# Patient Record
Sex: MALE | Race: WHITE | NOT HISPANIC OR LATINO | Employment: FULL TIME | URBAN - METROPOLITAN AREA
[De-identification: names, ages, dates, MRNs, and addresses within clinical notes are randomized per-mention and may not be internally consistent; named-entity substitution may affect disease eponyms.]

---

## 2017-01-21 ENCOUNTER — GENERIC CONVERSION - ENCOUNTER (OUTPATIENT)
Dept: OTHER | Facility: OTHER | Age: 57
End: 2017-01-21

## 2017-02-09 ENCOUNTER — GENERIC CONVERSION - ENCOUNTER (OUTPATIENT)
Dept: OTHER | Facility: OTHER | Age: 57
End: 2017-02-09

## 2017-02-09 LAB
A/G RATIO (HISTORICAL): 1.7 (ref 1.1–2.5)
ALBUMIN SERPL BCP-MCNC: 4.4 G/DL (ref 3.5–5.5)
ALP SERPL-CCNC: 78 IU/L (ref 39–117)
ALT SERPL W P-5'-P-CCNC: 20 IU/L (ref 0–44)
AST SERPL W P-5'-P-CCNC: 30 IU/L (ref 0–40)
BASOPHILS # BLD AUTO: 0 %
BASOPHILS # BLD AUTO: 0 X10E3/UL (ref 0–0.2)
BILIRUB SERPL-MCNC: 0.6 MG/DL (ref 0–1.2)
BUN SERPL-MCNC: 15 MG/DL (ref 6–24)
BUN/CREA RATIO (HISTORICAL): 15 (ref 9–20)
CALCIUM SERPL-MCNC: 9.5 MG/DL (ref 8.7–10.2)
CHLORIDE SERPL-SCNC: 99 MMOL/L (ref 96–106)
CHOLEST SERPL-MCNC: 216 MG/DL (ref 100–199)
CO2 SERPL-SCNC: 24 MMOL/L (ref 18–29)
CREAT SERPL-MCNC: 1 MG/DL (ref 0.76–1.27)
DEPRECATED RDW RBC AUTO: 14.1 % (ref 12.3–15.4)
EGFR AFRICAN AMERICAN (HISTORICAL): 96 ML/MIN/1.73
EGFR-AMERICAN CALC (HISTORICAL): 83 ML/MIN/1.73
EOSINOPHIL # BLD AUTO: 0.1 X10E3/UL (ref 0–0.4)
EOSINOPHIL # BLD AUTO: 1 %
GLUCOSE SERPL-MCNC: 79 MG/DL (ref 65–99)
HCT VFR BLD AUTO: 44.7 % (ref 37.5–51)
HDLC SERPL-MCNC: 62 MG/DL
HGB BLD-MCNC: 14.5 G/DL (ref 12.6–17.7)
IMM.GRANULOCYTES (CD4/8) (HISTORICAL): 0 %
IMM.GRANULOCYTES (CD4/8) (HISTORICAL): 0 X10E3/UL (ref 0–0.1)
LDLC SERPL CALC-MCNC: 136 MG/DL (ref 0–99)
LYMPHOCYTES # BLD AUTO: 1 X10E3/UL (ref 0.7–3.1)
LYMPHOCYTES # BLD AUTO: 21 %
MCH RBC QN AUTO: 28.2 PG (ref 26.6–33)
MCHC RBC AUTO-ENTMCNC: 32.4 G/DL (ref 31.5–35.7)
MCV RBC AUTO: 87 FL (ref 79–97)
MONOCYTES # BLD AUTO: 0.4 X10E3/UL (ref 0.1–0.9)
MONOCYTES (HISTORICAL): 8 %
NEUTROPHILS # BLD AUTO: 3.2 X10E3/UL (ref 1.4–7)
NEUTROPHILS # BLD AUTO: 70 %
PLATELET # BLD AUTO: 278 X10E3/UL (ref 150–379)
POTASSIUM SERPL-SCNC: 4.2 MMOL/L (ref 3.5–5.2)
RBC (HISTORICAL): 5.14 X10E6/UL (ref 4.14–5.8)
SODIUM SERPL-SCNC: 141 MMOL/L (ref 134–144)
TOT. GLOBULIN, SERUM (HISTORICAL): 2.6 G/DL (ref 1.5–4.5)
TOTAL PROTEIN (HISTORICAL): 7 G/DL (ref 6–8.5)
TRIGL SERPL-MCNC: 92 MG/DL (ref 0–149)
WBC # BLD AUTO: 4.7 X10E3/UL (ref 3.4–10.8)

## 2017-02-10 LAB — INTERPRETATION (HISTORICAL): NORMAL

## 2017-02-11 ENCOUNTER — GENERIC CONVERSION - ENCOUNTER (OUTPATIENT)
Dept: OTHER | Facility: OTHER | Age: 57
End: 2017-02-11

## 2017-02-20 ENCOUNTER — ALLSCRIPTS OFFICE VISIT (OUTPATIENT)
Dept: OTHER | Facility: OTHER | Age: 57
End: 2017-02-20

## 2017-09-08 ENCOUNTER — ALLSCRIPTS OFFICE VISIT (OUTPATIENT)
Dept: OTHER | Facility: OTHER | Age: 57
End: 2017-09-08

## 2017-09-08 DIAGNOSIS — M25.469 EFFUSION OF KNEE: ICD-10-CM

## 2017-09-08 DIAGNOSIS — R21 RASH AND OTHER NONSPECIFIC SKIN ERUPTION: ICD-10-CM

## 2017-09-15 ENCOUNTER — GENERIC CONVERSION - ENCOUNTER (OUTPATIENT)
Dept: OTHER | Facility: OTHER | Age: 57
End: 2017-09-15

## 2017-09-15 ENCOUNTER — HOSPITAL ENCOUNTER (OUTPATIENT)
Dept: RADIOLOGY | Facility: HOSPITAL | Age: 57
Discharge: HOME | End: 2017-09-15
Attending: FAMILY MEDICINE | Admitting: RADIOLOGY
Payer: COMMERCIAL

## 2017-09-15 DIAGNOSIS — R21 RASH AND OTHER NONSPECIFIC SKIN ERUPTION: ICD-10-CM

## 2017-09-15 DIAGNOSIS — M25.469 EFFUSION OF KNEE: ICD-10-CM

## 2017-09-15 PROCEDURE — 76882 US LMTD JT/FCL EVL NVASC XTR: CPT

## 2017-09-16 ENCOUNTER — GENERIC CONVERSION - ENCOUNTER (OUTPATIENT)
Dept: OTHER | Facility: OTHER | Age: 57
End: 2017-09-16

## 2017-09-17 LAB
LYME 18 KD IGG (HISTORICAL): PRESENT
LYME 23 KD IGG (HISTORICAL): ABNORMAL
LYME 23 KD IGM (HISTORICAL): ABNORMAL
LYME 28 KD IGG (HISTORICAL): ABNORMAL
LYME 30 KD IGG (HISTORICAL): ABNORMAL
LYME 39 KD IGG (HISTORICAL): PRESENT
LYME 39 KD IGM (HISTORICAL): ABNORMAL
LYME 41 KD IGG (HISTORICAL): PRESENT
LYME 41 KD IGM (HISTORICAL): ABNORMAL
LYME 45 KD IGG (HISTORICAL): PRESENT
LYME 58 KD IGG (HISTORICAL): PRESENT
LYME 66 KD IGG (HISTORICAL): ABNORMAL
LYME 93 KD IGG (HISTORICAL): ABNORMAL
LYME IGG WB INTERP. (HISTORICAL): POSITIVE
LYME IGG/IGM AB (HISTORICAL): 2.84 ISR (ref 0–0.9)
LYME IGM (HISTORICAL): 0.88 INDEX (ref 0–0.79)
LYME IGM WB INTERP. (HISTORICAL): NEGATIVE

## 2017-09-18 ENCOUNTER — GENERIC CONVERSION - ENCOUNTER (OUTPATIENT)
Dept: OTHER | Facility: OTHER | Age: 57
End: 2017-09-18

## 2018-01-09 NOTE — RESULT NOTES
Verified Results  [facility]) CMP14+eGFR [id number] 12:00AM [place]     Test Name Result Flag Reference   Glucose, Serum 81 mg/dL  65-99   BUN 9 mg/dL  6-24   Creatinine, Serum 0 78 mg/dL  0 76-1 27   eGFR If NonAfricn Am 101 mL/min/1 73  >59   eGFR If Africn Am 117 mL/min/1 73  >59   BUN/Creatinine Ratio 12  9-20   Sodium, Serum 140 mmol/L  134-144   Potassium, Serum 4 2 mmol/L  3 5-5 2   Chloride, Serum 99 mmol/L     Carbon Dioxide, Total 25 mmol/L  18-29   Calcium, Serum 9 3 mg/dL  8 7-10 2   Protein, Total, Serum 7 0 g/dL  6 0-8 5   Albumin, Serum 4 4 g/dL  3 5-5 5   Globulin, Total 2 6 g/dL  1 5-4 5   A/G Ratio 1 7  1 1-2 5   Bilirubin, Total 0 5 mg/dL  0 0-1 2   Alkaline Phosphatase, S 87 IU/L     AST (SGOT) 21 IU/L  0-40   ALT (SGPT) 18 IU/L  0-44     (LC) Lipid Panel [id number] 12:00AM [place] [facility]     Test Name Result Flag Reference   Cholesterol, Total 199 mg/dL  100-199   Triglycerides 95 mg/dL  0-149   HDL Cholesterol 51 mg/dL  >39   According to ATP-III Guidelines, HDL-C >59 mg/dL is considered a  negative risk factor for CHD  VLDL Cholesterol [place] 19 mg/dL  5-40   LDL Cholesterol Calc 129 mg/dL H 0-99     (LC) Thyroid Cascade Profile [id number] 12:00AM [facility]     Test Name Result Flag Reference   TSH 1 130 uIU/mL  0 450-4 500       Discussion/Summary   Sugar, kidneys, minerals and liver are normal    Cholesterol is normal    LDL bad cholesterol goal <160     Thyroid level is normal   Dr Thao Cons

## 2018-01-09 NOTE — PROGRESS NOTES
Assessment    1  Encounter for preventive health examination (V70 0) (Z00 00)   2  Left inguinal hernia (550 90) (K40 90)   3  Body mass index (BMI) 24 0-24 9, adult (V85 1) (Z68 24)   4  [place] cardiac risk 10-20% in next 10 years (V49 89) (Z91 89)    Plan  [place] cardiac risk 10-20% in next 10 years    · Aspirin 81 MG Oral Tablet Delayed Release; 1 every day  Persistent insomnia    · Zolpidem Tartrate 5 MG Oral Tablet; 1 Every Day At Bedtime, As Needed    Discussion/Summary  Impression: health maintenance visit  Testicular cancer screening: the risks and benefits of testicular cancer screening were discussed  Colorectal cancer screening: the risks and benefits of colorectal cancer screening were discussed  Advice and education were given regarding sunscreen use  Chief Complaint  Seen for a CPE  er/cma  History of Present Illness  HM, Adult Male: The patient is being seen for a health maintenance evaluation  General Health: The patient's health since the last visit is described as good  Immunizations status: up to date   not sure if had adacel  Lifestyle:  He consumes a diverse and healthy diet  He does not have any weight concerns  He does not use tobacco    Screening:      Review of Systems    Cardiovascular: no chest pain  Respiratory: no shortness of breath  Active Problems    1  Colon cancer screening (V76 51) (Z12 11)   2  Depression screening (V79 0) (Z13 89)   3  Diverticulosis of large intestine without hemorrhage (562 10) (K57 30)   4  Elevated PSA (790 93) (R97 20)   5  Fatigue (780 79) (R53 83)   6  Immunization due (V05 9) (Z23)   7  Persistent insomnia (307 42) (G47 00)   8  Peyronie disease (607 85) (N48 6)   9  Preop examination (V72 84) (Z01 818)   10  Prostate cancer screening (V76 44) (Z12 5)   11  Screening for cardiovascular, respiratory, and genitourinary diseases    (V81 2,V81 4,V81 6) (Z13 6,Z13 83,Z13 89)   12   Screening for diabetes mellitus (DM) (V77 1) (Z13 1)   13  Wears glasses (V49 89) (Z97 3)    Past Medical History    · History of Immunization due (V05 9) (Z23)   · History of Prediabetes (790 29) (R73 09)   · History of Right inguinal hernia (550 90) (K40 90)    Surgical History    · History of Hernia Repair    Family History  Mother    · Family history of Hypertension  Father    · Family history of High cholesterol    Social History    · Daily alcohol use   · Denied: History of Drug use   ·    · Never a smoker   · Two children    Current Meds   1  Zolpidem Tartrate 5 MG Oral Tablet; 1 Every Day At Bedtime, As Needed; Therapy: [id number] to (Evaluate:79Xiu9454); Last Rx:[id number] Ordered    Allergies    1  No Known Drug Allergies    Vitals   Recorded: 96Yxi9702 04:08PM   Temperature 97 3 F   Heart Rate 68   Respiration 16   Systolic 704   Diastolic 90   Height 5 ft 8 in   Weight 164 lb    BMI Calculated 24 94   BSA Calculated 1 88     Physical Exam    Constitutional   General appearance: No acute distress, well appearing and well nourished  Eyes   Conjunctiva and lids: No erythema, swelling or discharge  Pupils and irises: Equal, round, reactive to light  Ophthalmoscopic examination: Normal fundi and optic discs  Ears, Nose, Mouth, and Throat   External inspection of ears and nose: Normal     Otoscopic examination: Tympanic membranes translucent with normal light reflex  Canals patent without erythema  Nasal mucosa, septum, and turbinates: Normal without edema or erythema  Lips, teeth, and gums: Normal, good dentition  Oropharynx: Normal with no erythema, edema, exudate or lesions  Neck   Neck: Supple, symmetric, trachea midline, no masses  Pulmonary   Respiratory effort: No increased work of breathing or signs of respiratory distress  Auscultation of lungs: Clear to auscultation  Cardiovascular   Auscultation of heart: Normal rate and rhythm, normal S1 and S2, no murmurs  Carotid pulses: 2+ bilaterally  Abdominal aorta: Normal     Examination of extremities for edema and/or varicosities: Normal     Abdomen   Abdomen: Non-tender, no masses  Liver and spleen: No hepatomegaly or splenomegaly  Examination for hernias: Abnormal   small LIH  Anus, perineum, and rectum: Normal sphincter tone, no masses, no prolapse  Genitourinary   Scrotal contents: Normal testes, no masses  Penis: Normal, no lesions  Lymphatic   Palpation of lymph nodes in neck: No lymphadenopathy  Palpation of lymph nodes in groin: No lymphadenopathy  Musculoskeletal   Gait and station: Normal     Inspection/palpation of digits and nails: Normal without clubbing or cyanosis  Inspection/palpation of joints, bones, and muscles: Normal     Skin   Skin and subcutaneous tissue: Normal without rashes or lesions  Palpation of skin and subcutaneous tissue: Normal turgor  Neurologic   Reflexes: 2+ and symmetric  Sensation: No sensory loss  Psychiatric   Judgment and insight: Normal     Mood and affect: Normal        Results/Data  [place] Risk for General CVD [id number] 04:20PM Leilani Boothe     Test Name Result Flag Reference   [place] CVD - Ten Year 10 7 %     Sex: Male  Age: 61  Total Cholesterol: 216 mg/dL  HDL Cholesterol: 62 mg/dL  Systolic Blood Pressure: 773 mmHg  Diabetes: No  Smoking Status: No  Being treated for hypertension: No   [place] CVD - Heart Age 63 Years     [place] CVD - Normal 11 2 %         Procedure    Procedure: Visual Acuity Test    Indication: routine screening  Inforrmation supplied by a Snellen chart     Results: 20/20 in both eyes with corrective device, 20/20 in the right eye with corrective device, 20/20 in the left eye with corrective device      Provider Comments  Provider Comments:   prostate bx neg, f/u urology until clear      Signatures   Electronically signed by : Padmini Blackmon DO; Feb 20 2017  8:54PM EST                       (Author)

## 2018-01-09 NOTE — RESULT NOTES
Discussion/Summary   The ultrasound of the back of the left knee did not show any swelling or Berg's cyst  Dr Domenic Warren [id number] 08:33AM [place] [facility] Order Number: [id number]   Performing Comments: r/o Bakers cyst left KNEE   - Patient Instructions: To schedule this appointment, please contact Central Scheduling at [phone]  Test Name Result Flag Reference   US MSK LIMITED (Report)     ULTRASOUND LEFT POPLITEAL FOSSA     TECHNIQUE:  Using a high frequency transducer, the left popliteal fossa was scanned to evaluate for the presence of a Baker's cyst      INDICATION: 54-year-old man with swelling and rash in the posterior knee after hiking  COMPARISON:        FINDINGS:     Normal muscle and subcutaneous fat in the left popliteal fossa  No evidence of mass or fluid filled structure         IMPRESSION:     Normal sonogram  No left-sided Baker's cyst        Workstation performed: [id number]     Signed by:   Tasneem Machado MD   9/15/17

## 2018-01-10 NOTE — RESULT NOTES
Message   urology eval Dr Cara Chaparro     Verified Results  (1) PSA (SCREEN) (Dx V76 44 Screen for Prostate Cancer) [id number] 12:00AM [facility]     Test Name Result Flag Reference   Prostate Specific Ag, Serum 8 8 ng/mL H 0 0-4 0   Roche ECLIA methodology  According to the American Urological Association, Serum PSA should  decrease and remain at undetectable levels after radical  prostatectomy  The AUA defines biochemical recurrence as an initial  PSA value 0 2 ng/mL or greater followed by a subsequent confirmatory  PSA value 0 2 ng/mL or greater  Values obtained with different assay methods or kits cannot be used  interchangeably  Results cannot be interpreted as absolute evidence  of the presence or absence of malignant disease

## 2018-01-11 NOTE — PROGRESS NOTES
Assessment    1  Prostate cancer screening (V76 44) (Z12 5)   2  Colon cancer screening (V76 51) (Z12 11)   3  Encounter for preventive health examination (V70 0) (Z00 00)   4  Prediabetes (790 29) (R73 09)   5  Screening for diabetes mellitus (DM) (V77 1) (Z13 1)   6  Screening for lipid disorders (V77 91) (Z13 220)   7  Persistent insomnia (307 42) (G47 00)   8  Body mass index (BMI) 24 0-24 9, adult (V85 1) (Z68 24)    Plan  Colon cancer screening, Health Maintenance, Prediabetes, Prostate cancer screening,  Screening for diabetes mellitus (DM), Screening for lipid disorders    · (1) PSA (SCREEN) (Dx V76 44 Screen for Prostate Cancer); Status:Active; Requested  for:56Xqe6635;    · ([address] [place] [address]) CMP14+eGFR; Status:Active; Requested for:16Nyb7658;    · ([address] [place] [address]) Lipid Panel; Status:Active; Requested for:81Tzp3426;    · ([address] [place] [address]) Thyroid Cascade Profile; Status:Active; Requested for:70Dzv2909;   Persistent insomnia    · Zolpidem Tartrate 5 MG Oral Tablet; 1 Every Day At Bedtime, As Needed    Discussion/Summary  Impression: health maintenance visit  CALVIN done  The patient was counseled regarding risk factor reductions, impressions  Chief Complaint  Seen for a CPE  er/cma   saw uro in past but no significant problem  hx shane travis  had inguinal hernia repair on right  trouble sleeping for years, DFA but mostly DSA      History of Present Illness  HM, Adult Male: The patient is being seen for a health maintenance evaluation  General Health: The patient's health since the last visit is described as good  Lifestyle:  He consumes a diverse and healthy diet  He exercises regularly  He does not use tobacco  He consumes alcohol  gym, 1 red wine/d  Screening:      Review of Systems    Cardiovascular: no chest pain  Respiratory: no shortness of breath  Gastrointestinal: no abdominal pain and no blood in stools  Psychiatric: no anxiety and no depression  Active Problems    1   Colon cancer screening (V76 51) (Z12 11)   2  Depression screening (V79 0) (Z13 89)   3  Fatigue (780 79) (R53 83)   4  Immunization due (V05 9) (Z23)   5  Prediabetes (790 29) (R73 09)   6  Prostate cancer screening (V76 44) (Z12 5)   7  Screening for diabetes mellitus (DM) (V77 1) (Z13 1)   8  Screening for lipid disorders (V77 91) (Z13 220)   9  Wears glasses (V49 89) (Z97 3)    Past Medical History    · History of Immunization due (V05 9) (Z23)   · History of Right inguinal hernia (550 90) (K40 90)    Surgical History    · History of Hernia Repair    Family History    · Family history of Hypertension    · Family history of High cholesterol    Social History    · Daily alcohol use   · Denied: History of Drug use   ·    · Never a smoker   · Two children    Current Meds   1  No Reported Medications Recorded    Allergies    1  No Known Drug Allergies    Vitals   Recorded: [id number] 02:51PM   Temperature 98 1 F   Heart Rate 64   Respiration 16   Systolic 924   Diastolic 84   Height 5 ft 8 in   Weight 160 lb    BMI Calculated 24 33   BSA Calculated 1 86     Physical Exam    Constitutional   General appearance: No acute distress, well appearing and well nourished  Eyes   Conjunctiva and lids: No erythema, swelling or discharge  Pupils and irises: Equal, round, reactive to light  Ophthalmoscopic examination: Normal fundi and optic discs  Ears, Nose, Mouth, and Throat   External inspection of ears and nose: Normal     Otoscopic examination: Tympanic membranes translucent with normal light reflex  Canals patent without erythema  Hearing: Normal     Nasal mucosa, septum, and turbinates: Normal without edema or erythema  Lips, teeth, and gums: Normal, good dentition  Oropharynx: Normal with no erythema, edema, exudate or lesions  Neck   Neck: Supple, symmetric, trachea midline, no masses  Thyroid: Normal, no thyromegaly  Pulmonary   Respiratory effort: No increased work of breathing or signs of respiratory distress  Auscultation of lungs: Clear to auscultation  Cardiovascular   Auscultation of heart: Normal rate and rhythm, normal S1 and S2, no murmurs  Carotid pulses: 2+ bilaterally  Abdominal aorta: Normal     Femoral pulses: 2+ bilaterally  Pedal pulses: 2+ bilaterally  Examination of extremities for edema and/or varicosities: Normal     Abdomen   Abdomen: Non-tender, no masses  Liver and spleen: No hepatomegaly or splenomegaly  Examination for hernias: No hernias appreciated  Anus, perineum, and rectum: Normal sphincter tone, no masses, no prolapse  Genitourinary   Scrotal contents: Normal testes, no masses  Penis: Normal, no lesions  Digital rectal exam of prostate: Normal size, no masses  Lymphatic   Palpation of lymph nodes in neck: No lymphadenopathy  Palpation of lymph nodes in groin: No lymphadenopathy  Musculoskeletal   Gait and station: Normal     Inspection/palpation of digits and nails: Normal without clubbing or cyanosis  Inspection/palpation of joints, bones, and muscles: Normal     Range of motion: Normal     Stability: Normal     Muscle strength/tone: Normal     Skin   Skin and subcutaneous tissue: Normal without rashes or lesions  Palpation of skin and subcutaneous tissue: Normal turgor  Neurologic   Reflexes: 2+ and symmetric  Sensation: No sensory loss  Psychiatric   Judgment and insight: Normal     Mood and affect: Normal        Procedure    Procedure: Visual Acuity Test    Indication: routine screening  Inforrmation supplied by a Snellen chart     Results: 20/20 in both eyes without corrective device, 20/40 in the right eye without corrective device, 20/40 in the left eye without corrective device      Signatures   Electronically signed by : Korey Leblanc DO; Feb 16 2016  9:23PM EST                       (Author)

## 2018-01-11 NOTE — RESULT NOTES
Verified Results  ([facility]) Lyme Ab/Western Blot Reflex [id number] 07:42AM Phyliss Severs     Test Name Result Flag Reference   Lyme IgG/IgM Ab 2 84 ISR H 0 00-0 90   Negative         <0 91                                                 Equivocal  0 91 - 1 09                                                 Positive         >1 09   Lyme Disease Ab, Quant, IgM 0 88 index H 0 00-0 79   Negative         <0 80                                                 Equivocal  0 80 - 1 19                                                 Positive         >1 19                  IgM levels may peak at 3-6 weeks post infection, then                  gradually decline  IgG P93 Ab  Absent     IgG P66 Ab  Absent     IgG P58 Ab  Present A    IgG P45 Ab  Present A    IgG P41 Ab  Present A    IgG P39 Ab  Present A    IgG P30 Ab  Absent     IgG P28 Ab  Absent     IgG P23 Ab  Absent     IgG P18 Ab  Present A    Lyme IgG WB Interp  Positive A    Positive: 5 of the following                                                Borrelia-specific bands:                                                18,23,28,30,39,41,45,58,                                                66, and 93  Negative: No bands or banding                                                patterns which do not                                                meet positive criteria  IgM P41 Ab  Absent     IgM P39 Ab  Absent     IgM P23 Ab  Absent     Lyme IgM WB Interp  Negative     Note: An equivocal or positive EIA result followed by a negative  Western Blot result is considered NEGATIVE  An equivocal or positive  EIA result followed by a positive Western Blot is considered POSITIVE  by the CDC  Positive: 2 of the following bands: 23,39 or 41  Negative: No bands or banding patterns which do not meet positive  criteria    Criteria for positivity are those recommended by CDC/ASTPHLD   p23=Osp C, o02=hyetmnazy  Note:  Sera from individuals with the following may cross react in the  Lyme Western Blot assays: other spirochetal diseases (periodontal  disease, leptospirosis, relapsing fever, yaws, and pinta);  connective autoimmune (Rheumatoid Arthritis and Systemic Lupus  Erythematosus and also individuals with Antinuclear Antibody);  other infections COFFEE [facility] Spotted Fever; Scott-Barr Virus,  and Cytomegalovirus)         Plan  Lyme disease    · Doxycycline Hyclate 100 MG Oral Capsule; TAKE 1 CAPSULE TWICE DAILY  UNTIL GONE

## 2018-01-12 NOTE — RESULT NOTES
Verified Results  (1) COMPREHENSIVE METABOLIC PANEL [id number] 25:28LR Latricia Davidson     Test Name Result Flag Reference   Glucose, Serum 79 mg/dL  65-99   BUN 15 mg/dL  6-24   Creatinine, Serum 1 00 mg/dL  0 76-1 27   eGFR If NonAfricn Am 83 mL/min/1 73  >59   eGFR If Africn Am 96 mL/min/1 73  >59   BUN/Creatinine Ratio 15  9-20   Sodium, Serum 141 mmol/L  134-144   Potassium, Serum 4 2 mmol/L  3 5-5 2   Chloride, Serum 99 mmol/L     Carbon Dioxide, Total 24 mmol/L  18-29   Calcium, Serum 9 5 mg/dL  8 7-10 2   Protein, Total, Serum 7 0 g/dL  6 0-8 5   Albumin, Serum 4 4 g/dL  3 5-5 5   Globulin, Total 2 6 g/dL  1 5-4 5   A/G Ratio 1 7  1 1-2 5   Bilirubin, Total 0 6 mg/dL  0 0-1 2   Alkaline Phosphatase, S 78 IU/L     AST (SGOT) 30 IU/L  0-40   ALT (SGPT) 20 IU/L  0-44

## 2018-01-13 VITALS
HEIGHT: 68 IN | HEART RATE: 68 BPM | BODY MASS INDEX: 24.86 KG/M2 | SYSTOLIC BLOOD PRESSURE: 128 MMHG | RESPIRATION RATE: 16 BRPM | WEIGHT: 164 LBS | TEMPERATURE: 97.3 F | DIASTOLIC BLOOD PRESSURE: 90 MMHG

## 2018-01-14 VITALS
BODY MASS INDEX: 23.79 KG/M2 | HEART RATE: 60 BPM | SYSTOLIC BLOOD PRESSURE: 124 MMHG | HEIGHT: 68 IN | DIASTOLIC BLOOD PRESSURE: 80 MMHG | RESPIRATION RATE: 16 BRPM | WEIGHT: 157 LBS | TEMPERATURE: 95.6 F

## 2018-01-17 NOTE — MISCELLANEOUS
Message   Recorded as Task   Date: 12/12/2016 06:13 PM, Created By: Iveth Nava   Task Name: Follow Up   Assigned To: Livan Goldstein   Regarding Patient: Jolene Maya, Status: Active   Comment:    Livan Goldstein - 12 Dec 2016 6:13 PM     TASK CREATED  I received preop clearance request from his urologist Dr Florin Lacey  Attached is an rx of preop tests to be done for the surgery  If pt has the rx then he should have it done 1w before his preop visit on 12/20/16  If he does not have the rx, then he should  a copy of it (put in nurse pending)  The EKG can be done here at his visit (so he can cross it out)  LB   Livan Goldstein - 12 Dec 2016 6:18 PM     TASK EDITED  I s/w pt   he'll get labs done tomorrow for appt on 12/16/16, procedure is 12/20/16 actually          Signatures   Electronically signed by : Madison Roque DO; Dec 12 2016  6:18PM EST                       (Author)

## 2018-02-20 ENCOUNTER — TELEPHONE (OUTPATIENT)
Dept: FAMILY MEDICINE CLINIC | Facility: CLINIC | Age: 58
End: 2018-02-20

## 2018-02-21 DIAGNOSIS — Z13.83 SCREENING FOR CARDIOVASCULAR, RESPIRATORY, AND GENITOURINARY DISEASES: ICD-10-CM

## 2018-02-21 DIAGNOSIS — Z13.6 SCREENING FOR CARDIOVASCULAR, RESPIRATORY, AND GENITOURINARY DISEASES: ICD-10-CM

## 2018-02-21 DIAGNOSIS — Z00.00 HEALTHCARE MAINTENANCE: Primary | ICD-10-CM

## 2018-02-21 DIAGNOSIS — Z13.1 SCREENING FOR DIABETES MELLITUS (DM): ICD-10-CM

## 2018-02-21 DIAGNOSIS — Z13.89 SCREENING FOR CARDIOVASCULAR, RESPIRATORY, AND GENITOURINARY DISEASES: ICD-10-CM

## 2018-02-21 PROBLEM — A69.20 LYME DISEASE: Status: ACTIVE | Noted: 2017-09-17

## 2018-02-21 PROBLEM — K40.90 LEFT INGUINAL HERNIA: Status: ACTIVE | Noted: 2017-02-20

## 2018-02-21 RX ORDER — ZOLPIDEM TARTRATE 5 MG/1
TABLET ORAL
COMMUNITY
Start: 2016-02-16 | End: 2018-03-16 | Stop reason: SDUPTHER

## 2018-02-26 LAB
ALBUMIN SERPL-MCNC: 4.3 G/DL (ref 3.5–5.5)
ALBUMIN/GLOB SERPL: 1.9 {RATIO} (ref 1.2–2.2)
ALP SERPL-CCNC: 75 IU/L (ref 39–117)
ALT SERPL-CCNC: 18 IU/L (ref 0–44)
AMBIG ABBREV DEFAULT: NORMAL
AST SERPL-CCNC: 27 IU/L (ref 0–40)
BILIRUB SERPL-MCNC: 0.4 MG/DL (ref 0–1.2)
BUN SERPL-MCNC: 10 MG/DL (ref 6–24)
BUN/CREAT SERPL: 12 (ref 9–20)
CALCIUM SERPL-MCNC: 9.3 MG/DL (ref 8.7–10.2)
CHLORIDE SERPL-SCNC: 102 MMOL/L (ref 96–106)
CHOLEST SERPL-MCNC: 186 MG/DL (ref 100–199)
CO2 SERPL-SCNC: 25 MMOL/L (ref 18–29)
CREAT SERPL-MCNC: 0.82 MG/DL (ref 0.76–1.27)
GLOBULIN SER-MCNC: 2.3 G/DL (ref 1.5–4.5)
GLUCOSE SERPL-MCNC: 102 MG/DL (ref 65–99)
HDLC SERPL-MCNC: 56 MG/DL
LDLC SERPL CALC-MCNC: 115 MG/DL (ref 0–99)
MICRODELETION SYND BLD/T FISH: NORMAL
POTASSIUM SERPL-SCNC: 4.4 MMOL/L (ref 3.5–5.2)
PROT SERPL-MCNC: 6.6 G/DL (ref 6–8.5)
SL AMB EGFR AFRICAN AMERICAN: 113 ML/MIN/1.73
SL AMB EGFR NON AFRICAN AMERICAN: 97 ML/MIN/1.73
SODIUM SERPL-SCNC: 140 MMOL/L (ref 134–144)
TRIGL SERPL-MCNC: 76 MG/DL (ref 0–149)

## 2018-03-16 ENCOUNTER — OFFICE VISIT (OUTPATIENT)
Dept: FAMILY MEDICINE CLINIC | Facility: CLINIC | Age: 58
End: 2018-03-16
Payer: COMMERCIAL

## 2018-03-16 VITALS
DIASTOLIC BLOOD PRESSURE: 80 MMHG | SYSTOLIC BLOOD PRESSURE: 128 MMHG | RESPIRATION RATE: 16 BRPM | TEMPERATURE: 96.2 F | BODY MASS INDEX: 23.34 KG/M2 | HEART RATE: 64 BPM | HEIGHT: 68 IN | WEIGHT: 154 LBS

## 2018-03-16 DIAGNOSIS — Z00.00 HEALTHCARE MAINTENANCE: Primary | ICD-10-CM

## 2018-03-16 DIAGNOSIS — G47.00 PERSISTENT INSOMNIA: ICD-10-CM

## 2018-03-16 DIAGNOSIS — K40.90 LEFT INGUINAL HERNIA: ICD-10-CM

## 2018-03-16 DIAGNOSIS — Z91.89 FRAMINGHAM CARDIAC RISK <10% IN NEXT 10 YEARS: ICD-10-CM

## 2018-03-16 DIAGNOSIS — R73.01 IFG (IMPAIRED FASTING GLUCOSE): ICD-10-CM

## 2018-03-16 PROCEDURE — 99396 PREV VISIT EST AGE 40-64: CPT | Performed by: FAMILY MEDICINE

## 2018-03-16 RX ORDER — ZOLPIDEM TARTRATE 5 MG/1
5 TABLET ORAL
Qty: 30 TABLET | Refills: 5 | Status: SHIPPED | OUTPATIENT
Start: 2018-03-16 | End: 2018-05-21 | Stop reason: SDUPTHER

## 2018-03-16 NOTE — PROGRESS NOTES
Assessment/Plan:    Cont zolpidem prn  uro f/u for psa  LIH small but monitor  Prediabetes on labs aware     Diagnoses and all orders for this visit:    [place] cardiac risk <10% in next 10 years    Healthcare maintenance    Persistent insomnia  -     zolpidem (AMBIEN) 5 mg tablet; Take 1 tablet (5 mg total) by mouth daily at bedtime as needed for sleep    Left inguinal hernia    IFG (impaired fasting glucose)        Labs reviewed  Prediabetes advised  Diet/exercise/weight loss is recommended  framingham score 9 4 this year, statin use discussed,we will recalculate next year    No Follow-up on file  Subjective:      Patient ID: Paty Che is a 61 y o  male  Chief Complaint   Patient presents with    Physical Exam       HPI  occas zolpidem  Prn  uro f/u, bx neg but psa over 4 consistently  Eats healthy  No mood issues    The following portions of the patient's history were reviewed and updated as appropriate: allergies, current medications, past family history, past medical history, past social history, past surgical history and problem list     Review of Systems   Respiratory: Negative for shortness of breath  Cardiovascular: Negative for chest pain  Current Outpatient Prescriptions   Medication Sig Dispense Refill    aspirin 81 MG tablet Take by mouth daily      zolpidem (AMBIEN) 5 mg tablet Take 1 tablet (5 mg total) by mouth daily at bedtime as needed for sleep 30 tablet 5     No current facility-administered medications for this visit  Objective:    /80   Pulse 64   Temp (!) 96 2 °F (35 7 °C)   Resp 16   Ht 5' 8" (1 727 m)   Wt 69 9 kg (154 lb)   BMI 23 42 kg/m²        Physical Exam   Constitutional: He appears well-developed  HENT:   Head: Normocephalic  Eyes: Conjunctivae are normal    Neck: Neck supple  Cardiovascular: Normal rate and intact distal pulses  Pulmonary/Chest: Effort normal  No respiratory distress  Abdominal: Soft     Genitourinary: Penis normal    Genitourinary Comments: Small left inguinal hernia   Musculoskeletal: He exhibits no edema or deformity  Neurological: He is alert  Skin: Skin is warm and dry  Psychiatric: His behavior is normal  Thought content normal    Nursing note and vitals reviewed               Emeterio Boland DO

## 2018-05-21 ENCOUNTER — TELEPHONE (OUTPATIENT)
Dept: FAMILY MEDICINE CLINIC | Facility: CLINIC | Age: 58
End: 2018-05-21

## 2018-05-21 DIAGNOSIS — G47.00 PERSISTENT INSOMNIA: Primary | ICD-10-CM

## 2018-05-21 RX ORDER — ZOLPIDEM TARTRATE 5 MG/1
5 TABLET ORAL
Qty: 30 TABLET | Refills: 2 | Status: SHIPPED | OUTPATIENT
Start: 2018-05-21 | End: 2018-11-28 | Stop reason: SDUPTHER

## 2018-05-21 NOTE — TELEPHONE ENCOUNTER
Please advise  I spoke with Shannon from [facility]  States that pt brought in a script for Ambien dated 3/2018  She stated she could take a verbal and fill it for this date  Due to the fact that it is a controlled substance I did not give verbal  If you are okay with filling this please [facility]  Thank you   Krzysztof Rodriguez, [address]

## 2018-11-28 DIAGNOSIS — G47.00 PERSISTENT INSOMNIA: ICD-10-CM

## 2018-11-28 RX ORDER — ZOLPIDEM TARTRATE 5 MG/1
5 TABLET ORAL
Qty: 30 TABLET | Refills: 0 | Status: SHIPPED | OUTPATIENT
Start: 2018-11-28 | End: 2019-04-22 | Stop reason: SDUPTHER

## 2019-03-19 ENCOUNTER — TELEPHONE (OUTPATIENT)
Dept: FAMILY MEDICINE CLINIC | Facility: CLINIC | Age: 59
End: 2019-03-19

## 2019-03-19 DIAGNOSIS — R73.01 IFG (IMPAIRED FASTING GLUCOSE): ICD-10-CM

## 2019-03-19 DIAGNOSIS — Z13.6 SCREENING FOR CARDIOVASCULAR, RESPIRATORY, AND GENITOURINARY DISEASES: Primary | ICD-10-CM

## 2019-03-19 DIAGNOSIS — Z13.83 SCREENING FOR CARDIOVASCULAR, RESPIRATORY, AND GENITOURINARY DISEASES: Primary | ICD-10-CM

## 2019-03-19 DIAGNOSIS — Z13.89 SCREENING FOR CARDIOVASCULAR, RESPIRATORY, AND GENITOURINARY DISEASES: Primary | ICD-10-CM

## 2019-03-19 DIAGNOSIS — Z13.1 SCREENING FOR DIABETES MELLITUS (DM): ICD-10-CM

## 2019-03-19 NOTE — TELEPHONE ENCOUNTER
DR Mando Veliz    Patient needs a lab slip for lab rajani   He has an upcomming cpe      Please fax to [phone]

## 2019-03-21 NOTE — TELEPHONE ENCOUNTER
Called patient because fax number is not a working fax and he will give me another fax number but was driving at the time    Told him to ask for me when he calls

## 2019-04-03 LAB
ALBUMIN SERPL-MCNC: 4.6 G/DL (ref 3.5–5.5)
ALBUMIN/GLOB SERPL: 1.9 {RATIO} (ref 1.2–2.2)
ALP SERPL-CCNC: 78 IU/L (ref 39–117)
ALT SERPL-CCNC: 18 IU/L (ref 0–44)
AST SERPL-CCNC: 28 IU/L (ref 0–40)
BILIRUB SERPL-MCNC: 0.5 MG/DL (ref 0–1.2)
BUN SERPL-MCNC: 9 MG/DL (ref 6–24)
BUN/CREAT SERPL: 10 (ref 9–20)
CALCIUM SERPL-MCNC: 9.5 MG/DL (ref 8.7–10.2)
CHLORIDE SERPL-SCNC: 102 MMOL/L (ref 96–106)
CHOLEST SERPL-MCNC: 228 MG/DL (ref 100–199)
CO2 SERPL-SCNC: 25 MMOL/L (ref 20–29)
CREAT SERPL-MCNC: 0.91 MG/DL (ref 0.76–1.27)
GLOBULIN SER-MCNC: 2.4 G/DL (ref 1.5–4.5)
GLUCOSE SERPL-MCNC: 97 MG/DL (ref 65–99)
HBA1C MFR BLD: 5.7 % (ref 4.8–5.6)
HDLC SERPL-MCNC: 60 MG/DL
LABCORP COMMENT: NORMAL
LDLC SERPL CALC-MCNC: 155 MG/DL (ref 0–99)
MICRODELETION SYND BLD/T FISH: NORMAL
POTASSIUM SERPL-SCNC: 5 MMOL/L (ref 3.5–5.2)
PROT SERPL-MCNC: 7 G/DL (ref 6–8.5)
SL AMB EGFR AFRICAN AMERICAN: 106 ML/MIN/1.73
SL AMB EGFR NON AFRICAN AMERICAN: 92 ML/MIN/1.73
SODIUM SERPL-SCNC: 141 MMOL/L (ref 134–144)
TRIGL SERPL-MCNC: 64 MG/DL (ref 0–149)

## 2019-04-22 ENCOUNTER — OFFICE VISIT (OUTPATIENT)
Dept: FAMILY MEDICINE CLINIC | Facility: CLINIC | Age: 59
End: 2019-04-22
Payer: COMMERCIAL

## 2019-04-22 VITALS
SYSTOLIC BLOOD PRESSURE: 138 MMHG | HEART RATE: 80 BPM | DIASTOLIC BLOOD PRESSURE: 80 MMHG | WEIGHT: 156 LBS | BODY MASS INDEX: 23.64 KG/M2 | HEIGHT: 68 IN | RESPIRATION RATE: 16 BRPM | TEMPERATURE: 97 F

## 2019-04-22 DIAGNOSIS — Z00.00 HEALTHCARE MAINTENANCE: Primary | ICD-10-CM

## 2019-04-22 DIAGNOSIS — Z91.89 FRAMINGHAM CARDIAC RISK 10-20% IN NEXT 10 YEARS: ICD-10-CM

## 2019-04-22 DIAGNOSIS — G47.00 PERSISTENT INSOMNIA: ICD-10-CM

## 2019-04-22 DIAGNOSIS — R97.20 ELEVATED PSA: ICD-10-CM

## 2019-04-22 PROCEDURE — 99396 PREV VISIT EST AGE 40-64: CPT | Performed by: FAMILY MEDICINE

## 2019-04-22 RX ORDER — ZOLPIDEM TARTRATE 5 MG/1
5 TABLET ORAL
Qty: 30 TABLET | Refills: 3 | Status: SHIPPED | OUTPATIENT
Start: 2019-04-22 | End: 2020-06-05 | Stop reason: SDUPTHER

## 2019-05-03 NOTE — RESULT NOTES
Verified Results  (1) CBC/PLT/DIFF [id number] 12:09PM Yudelka Salazar     Test Name Result Flag Reference   WBC 4 7 x10E3/uL  3 4-10 8   RBC 5 14 x10E6/uL  4 14-5 80   Hemoglobin 14 5 g/dL  12 6-17 7   Hematocrit 44 7 %  37 5-51 0   MCV 87 fL  79-97   MCH 28 2 pg  26 6-33 0   MCHC 32 4 g/dL  31 5-35 7   RDW 14 1 %  12 3-15 4   Platelets 418 R89S0/PF  150-379   Neutrophils 70 %     Lymphs 21 %     Monocytes 8 %     Eos 1 %     Basos 0 %     Neutrophils (Absolute) 3 2 x10E3/uL  1 4-7 0   Lymphs (Absolute) 1 0 x10E3/uL  0 7-3 1   Monocytes(Absolute) 0 4 x10E3/uL  0 1-0 9   Eos (Absolute) 0 1 x10E3/uL  0 0-0 4   Baso (Absolute) 0 0 x10E3/uL  0 0-0 2   Immature Granulocytes 0 %     Immature Grans (Abs) 0 0 x10E3/uL  0 0-0 1 Name band;

## 2019-09-10 ENCOUNTER — OFFICE VISIT (OUTPATIENT)
Dept: FAMILY MEDICINE CLINIC | Facility: CLINIC | Age: 59
End: 2019-09-10
Payer: COMMERCIAL

## 2019-09-10 VITALS
RESPIRATION RATE: 16 BRPM | TEMPERATURE: 97 F | WEIGHT: 152 LBS | HEART RATE: 51 BPM | BODY MASS INDEX: 23.04 KG/M2 | OXYGEN SATURATION: 97 % | DIASTOLIC BLOOD PRESSURE: 80 MMHG | HEIGHT: 68 IN | SYSTOLIC BLOOD PRESSURE: 130 MMHG

## 2019-09-10 DIAGNOSIS — Z13.89 SCREENING FOR CARDIOVASCULAR, RESPIRATORY, AND GENITOURINARY DISEASES: ICD-10-CM

## 2019-09-10 DIAGNOSIS — Z13.83 SCREENING FOR CARDIOVASCULAR, RESPIRATORY, AND GENITOURINARY DISEASES: ICD-10-CM

## 2019-09-10 DIAGNOSIS — Z13.6 SCREENING FOR CARDIOVASCULAR, RESPIRATORY, AND GENITOURINARY DISEASES: ICD-10-CM

## 2019-09-10 DIAGNOSIS — Z13.1 SCREENING FOR DIABETES MELLITUS (DM): ICD-10-CM

## 2019-09-10 DIAGNOSIS — M79.18 BRACHIORADIALIS MUSCLE TENDERNESS: Primary | ICD-10-CM

## 2019-09-10 DIAGNOSIS — G47.00 PERSISTENT INSOMNIA: ICD-10-CM

## 2019-09-10 DIAGNOSIS — R73.01 IFG (IMPAIRED FASTING GLUCOSE): ICD-10-CM

## 2019-09-10 PROCEDURE — 99213 OFFICE O/P EST LOW 20 MIN: CPT | Performed by: FAMILY MEDICINE

## 2019-09-10 PROCEDURE — 3008F BODY MASS INDEX DOCD: CPT | Performed by: FAMILY MEDICINE

## 2019-09-10 RX ORDER — METHYLPREDNISOLONE 4 MG/1
TABLET ORAL
Qty: 21 TABLET | Refills: 0 | Status: SHIPPED | OUTPATIENT
Start: 2019-09-10 | End: 2019-09-16

## 2019-09-10 NOTE — PROGRESS NOTES
Assessment/Plan:    No problem-specific Assessment & Plan notes found for this encounter  If no better he can call for PT order    cpe labs given    Steroid risks aware  Ice bid    Insomnia stable, uses ambien prn, sometimes 1/2 pill     Diagnoses and all orders for this visit:    Brachioradialis muscle tenderness  -     methylPREDNISolone 4 MG tablet therapy pack; Use as directed on package    Persistent insomnia    Screening for cardiovascular, respiratory, and genitourinary diseases  -     Lipid Panel with Direct LDL reflex; Future    Screening for diabetes mellitus (DM)  -     Comprehensive metabolic panel; Future    IFG (impaired fasting glucose)  -     Hemoglobin A1C; Future        Return for Annual physical     Subjective:      Patient ID: Ronald Vides is a 60 y o  male  Chief Complaint   Patient presents with    right arm pain     started 2 month ago-wmcma       HPI  Right arm pain  Few wks ago got better then reinjured pulling trees  Sharp  Cullen proximal forearm  Cullen with pronation and wrist extension  No ice  No nsaids  No waking  Fine movt ok  No numbness  No arm swelling or redness    RHD    The following portions of the patient's history were reviewed and updated as appropriate: allergies, current medications, past family history, past medical history, past social history, past surgical history and problem list     Review of Systems   Constitutional: Negative for fever  Respiratory: Negative for shortness of breath  Cardiovascular: Negative for chest pain  Current Outpatient Medications   Medication Sig Dispense Refill    aspirin 81 MG tablet Take by mouth daily      zolpidem (AMBIEN) 5 mg tablet Take 1 tablet (5 mg total) by mouth daily at bedtime as needed for sleep 30 tablet 3    methylPREDNISolone 4 MG tablet therapy pack Use as directed on package 21 tablet 0     No current facility-administered medications for this visit          Objective:    /80   Pulse (!) 51 Temp (!) 97 °F (36 1 °C)   Resp 16   Ht 5' 8" (1 727 m)   Wt 68 9 kg (152 lb)   SpO2 97%   BMI 23 11 kg/m²        Physical Exam   Constitutional: He appears well-developed  No distress  HENT:   Head: Normocephalic  Mouth/Throat: No oropharyngeal exudate  Eyes: Conjunctivae are normal  No scleral icterus  Neck: Neck supple  Cardiovascular: Normal rate, regular rhythm, normal heart sounds and intact distal pulses  No murmur heard  Pulmonary/Chest: Effort normal and breath sounds normal  No respiratory distress  He has no wheezes  He has no rales  Abdominal: Soft  Bowel sounds are normal  He exhibits no distension  Musculoskeletal: Normal range of motion  He exhibits tenderness  He exhibits no edema or deformity  Pain with resisted elbow flexion  No pain at lateral epicondyle  Arm neurovasc intact   Neurological: He is alert  He displays normal reflexes  He exhibits normal muscle tone  Skin: Skin is warm and dry  Psychiatric: His behavior is normal  Thought content normal    Nursing note and vitals reviewed               Luz Chapman DO

## 2019-09-23 ENCOUNTER — TELEPHONE (OUTPATIENT)
Dept: FAMILY MEDICINE CLINIC | Facility: CLINIC | Age: 59
End: 2019-09-23

## 2019-09-23 DIAGNOSIS — M79.18 BRACHIORADIALIS MUSCLE TENDERNESS: Primary | ICD-10-CM

## 2019-10-02 ENCOUNTER — EVALUATION (OUTPATIENT)
Dept: PHYSICAL THERAPY | Facility: CLINIC | Age: 59
End: 2019-10-02
Payer: COMMERCIAL

## 2019-10-02 DIAGNOSIS — M79.18 BRACHIORADIALIS MUSCLE TENDERNESS: Primary | ICD-10-CM

## 2019-10-02 PROCEDURE — 97161 PT EVAL LOW COMPLEX 20 MIN: CPT

## 2019-10-02 NOTE — PROGRESS NOTES
PT Evaluation     Today's date: 10/2/2019  Patient name: Vaughn Mendenhall  : 1960  MRN: [id number]  Referring provider: Lidia Garcia DO  Dx:   Encounter Diagnosis     ICD-10-CM    1  Brachioradialis muscle tenderness M79 18 Ambulatory referral to Physical Therapy                  Assessment  Assessment details: Vaughn Mendenhall is a 60 y o  male who presents with pain, decreased shoulder IR  ROM, postural  dysfunction and tension in brachioradialis, + wrist extensor stretch, reduced thoracic segment mobility and tspine rotation and reduced function  Due to these impairments, patient has difficulty performing ADL's, recreational activities, lifting/carrying  Patient's clinical presentation is consistent with their referring diagnosis of Brachioradialis muscle tenderness  (primary encounter diagnosis)  Plan: Ambulatory referral to Physical Therapy    Patient has been educated in home exercise program and plan of care   Patient would benefit from skilled physical therapy services to address their aforementioned functional limitations and progress towards prior level of function and independence with home exercise program    Impairments: abnormal muscle tone, abnormal or restricted ROM, activity intolerance, lacks appropriate home exercise program, pain with function, poor posture  and poor body mechanics  Functional limitations: see subjectiveUnderstanding of Dx/Px/POC: good   Prognosis: good    Goals  STG  + Patient will have pain level 0/10 elbow  at rest after PT (2-3 weeks)  + Patient will report a 40% improvement in symptoms (2-3 weeks)   + Patient will be independent in basic HEP (2-3 weeks)  + Patient will demonstrate good posture with verbal cuing   (2-3 weeks)  + Patient will demonstrate an increase in range of motion shoulder Internal Rotation  to  By 10 degrees  (2-3 weeks)  + Patient will report increased ease reaching with weight due to a reduction in pain (2-3 weeks)  + Patient will increase tpsine mobility to wnl PA mobs ( 2 weeks)  LTG  + Patient will have pain level 0/10 shoulder, elbow  with ADL's (4-6 weeks)  + Patient will report a 75% improvement in symptoms (4-6 weeks)   + Patient will increase FOTO score by 10 points (8 sessions)   + Patient will be independent in comprehensive HEP (4-6 weeks)  + Patient will demonstrate good posture independently  (4-6 weeks)  + Patient will demonstrate an increase in range of motion shoulder Internal Rotation  to  Equal left (4-6 weeks)  + Patient will demonstrate functional reaching behind back without pain (4-6 weeks)  + Patient will report no functional limitations ( 4-6 weeks)      Plan  Plan details: 2 x week, 4- 6 weeks: HEP development, stretching postural muscles and upper quadrant musculature, strengthening scap and shoulder musculature, A/AA/PROM prn, joint mobilizations ST/GH/Tspine/Rib/tspine spine prn per objective findings, posture education, STM/MI as needed to reduce muscle tension per objective findings, muscle reeducation per objective findings, scap retraining prn, Ktape, PLOC discussed and agreed upon with patient       Patient would benefit from: PT eval and skilled physical therapy  Planned modality interventions: cryotherapy and thermotherapy: hydrocollator packs  Planned therapy interventions: abdominal trunk stabilization, joint mobilization, activity modification, ADL training, neuromuscular re-education, body mechanics training, patient education, postural training, strengthening, stretching, therapeutic activities, therapeutic exercise, flexibility, functional ROM exercises, graded exercise and home exercise program  Frequency: 2x week  Plan of Care beginning date: 10/2/2019  Plan of Care expiration date: 11/13/2019  Treatment plan discussed with: patient        Subjective Evaluation    History of Present Illness  Mechanism of injury: Pain began about 4 months ago, was slowly improving, then ~ 2 months ago aggravated symptoms when he was pulling out stumps  Improved with oral steroid but has not completely resolved  Originally symptoms were in elbow area and radiated up to mid brachium, and can radiate to shoulder  Originates around elbow on right   Pain with pronation    Function:  Difficulty with 1 hour + rides on motorcycle, feels weak with household chores           Not a recurrent problem   Quality of life: good    Pain  Current pain ratin  At best pain ratin  At worst pain rating: 3  Location: see above  Quality: sharp  Progression: no change    Social Support    Employment status: working (: computer use)  Hand dominance: right  Exercise history: none      Diagnostic Tests  No diagnostic tests performed  Treatments  Previous treatment: medication  Patient Goals  Patient goals for therapy: decreased pain and independence with ADLs/IADLs  Patient goal: to be able to use arm without pain and restriction        Objective     Static Posture     Comments  Shoulder:  Posture  Sitting: fair:   Standing: grossly wnl    Cervical ROM:  Flexion:wnl    Extension:wnl    Rotation right:wnl   Rotation left:wnl   SB right:wnl   SB left: wnl     Palpation:no TTP in area of forearm musculature    Special tests: + pain reproduced with wrist extension stretch       Functional reaching: (tested in standing)  Overhead: Right: WFL, painfree Left: WFL, painfree  Behind head: Right: WFL, painfree Left: WFL, painfree  Behind back :  Right: WFL, painful Left: WFL, painfree    MMT  Flexion (supine): Right: 4/5 no pain  Left: 4/5 no pain   Abduction (supine): Right: 4/5 no pain    Left: 4/5  no pain   ER: Right: 4/5 no pain   Left: 4/5 no pain   IR: Right: 4/5 + pain   Left: 4/5 no pain   Adduction: Right: 4/5 no pain    Left: 4/5 no pain   Extension: Right: 4/5  no pain   Left: 4/5 no pain     Bicep: Right: 4/5  + pain  Left: 4/5 no pain   Tricep: Right: 4/5  no pain   Left: 4/5 no pain    : Left: 48 lb, 54 lb, 50 lb  Right: 49 lb, 51 lb, 50 lb   Pronation 4/5 painfree aris  Supination: 4/5 painfree aris    ROM  Flexion: Right: wnl  Left: wnl   Abduction: Right: wnl    Left: wnl   Er (supine 45 deg abd): Right: wnl    Left: wnl   IR (supine at 45 deg abd) : Right: 62   Left: 75   Thoracic rotation ROM in sidelying:  Right: mod LOm no pain , Left: mod LOm     Type II dysfunction: thoracic: rotation: bilaterally moderate LOM                Precautions none    Specialty Daily Treatment Diary       Insurance: 20%       Visits: 1       Manual 10/2/19               tspine mobs: PA, rotation PA performed               STM/ MI brachioradialis Next visit        Total Time:                 Exercise Diary         UBE/Nustep                Scap Isometrics Next visit                Tubing rows        Tubing ext        Aris scap retraction w/ER                Wrist extensor stretch  instruct       sidelying tspine rotation instruct       Sleeper stretch         hammer sup/pro: elbow flexed, elbow ext        sitting posture instruct       Wrist flex/ext w/ band                 Total time:                 Modalities        MH        Ice        Total time:

## 2019-10-07 ENCOUNTER — OFFICE VISIT (OUTPATIENT)
Dept: PHYSICAL THERAPY | Facility: CLINIC | Age: 59
End: 2019-10-07
Payer: COMMERCIAL

## 2019-10-07 DIAGNOSIS — M79.18 BRACHIORADIALIS MUSCLE TENDERNESS: Primary | ICD-10-CM

## 2019-10-07 PROCEDURE — 97110 THERAPEUTIC EXERCISES: CPT | Performed by: PHYSICAL THERAPIST

## 2019-10-07 PROCEDURE — 97140 MANUAL THERAPY 1/> REGIONS: CPT | Performed by: PHYSICAL THERAPIST

## 2019-10-07 NOTE — PROGRESS NOTES
Daily Note     Today's date: 10/7/2019  Patient name: Jerome Chadwick  : 1960  MRN: [id number]  Referring provider: Jacoby Ely DO  Dx:   Encounter Diagnosis     ICD-10-CM    1  Brachioradialis muscle tenderness M79 18        Subjective: Pt states he does not have any pain at this time but reporting he only has "pain during certain motions like washing a mirror" R brachioradialis    Objective: See treatment diary below  Precautions none    Specialty Daily Treatment Diary       Insurance: 20%       Visits: 1 2      Manual 10/2/19 10/7/19              tspine mobs: PA, rotation PA performed -              STM/ MI brachioradialis Next visit  IASTM to R brachioradialis x 8 min, pt seated      Total Time:                 Exercise Diary         UBE/Nustep  UBE level  forward/back every 1 min              Scap Isometrics Next visit                Tubing rows  Blue TB 2x10 hold 5      Tubing ext  Green TB 2x10      Aris scap retraction w/ER                Wrist extensor stretch  instruct       sidelying tspine rotation instruct       Sleeper stretch         hammer sup/pro: elbow flexed, elbow ext  Hammer curls 2x10 7#  seated Pronation/supination 2x10 3#       sitting posture instruct instructed      Wrist flex/ext w/ band                 Total time:                 Modalities        MH        Ice  5 min R brachioradialis      Total time:                      Assessment: Tolerated treatment well  Patient demonstrated fatigue post treatment  Pt responded well to IASTM to R brachioradialis and instructed pt to use CP as needed x 5 min to R brachioradialis  Noted R brachioradialis adhesions with Hawk  tool for IASTM  Emphasis on postural awareness with all therex  Plan: Continue per plan of care    Progress pt as per primary PT

## 2019-10-09 ENCOUNTER — OFFICE VISIT (OUTPATIENT)
Dept: PHYSICAL THERAPY | Facility: CLINIC | Age: 59
End: 2019-10-09
Payer: COMMERCIAL

## 2019-10-09 DIAGNOSIS — M79.18 BRACHIORADIALIS MUSCLE TENDERNESS: Primary | ICD-10-CM

## 2019-10-09 PROCEDURE — 97140 MANUAL THERAPY 1/> REGIONS: CPT | Performed by: PHYSICAL THERAPIST

## 2019-10-09 PROCEDURE — 97110 THERAPEUTIC EXERCISES: CPT | Performed by: PHYSICAL THERAPIST

## 2019-10-09 NOTE — PROGRESS NOTES
Daily Note     Today's date: 10/9/2019  Patient name: Jorge Alberto Chu  : 1960  MRN: [id number]  Referring provider: Gilbert Baldwin DO  Dx:   Encounter Diagnosis     ICD-10-CM    1  Brachioradialis muscle tenderness M79 18        Subjective: Pt states he does not have any pain at this time but reporting he only has "pain during certain motions like washing a mirror" R brachioradialis  10/9/19: Pain 3/10 at worst per pt report, R brachioradialis region    Objective: See treatment diary below  Precautions none    Specialty Daily Treatment Diary       Insurance: 20%       Visits: 1 2 3     Manual 10/2/19 10/7/19 10/9             tspine mobs: PA, rotation PA performed -              STM/ MI brachioradialis Next visit  IASTM to R brachioradialis x 8 min, pt seated IASTM to R brachioradialis x8 min, pt seated     Total Time:                 Exercise Diary         UBE/Nustep  UBE level 8/7 forward/back every 1 min UBE level 7 forward/nbackward every min             Scap Isometrics Next visit   tricep kickback 5# 2x10 hold 5             Tubing rows  Blue TB 2x10 hold 5 Blue TB 2x10 hold 5     Tubing ext  Green TB 2x10 Green TB 2x10     Aris scap retraction w/ER   Green TB 2x10 hold 5             Wrist extensor stretch  instruct       sidelying tspine rotation instruct       Sleeper stretch         hammer sup/pro: elbow flexed, elbow ext  Hammer curls 2x10 7#  seated Pronation/supination 2x10 3#  Hammer curls 2x10 7# seated pronation/supination 2x10 3#     sitting posture instruct instructed      Wrist flex/ext w/ band                 Total time:                 Modalities        MH        Ice  5 min R brachioradialis 5 min R brachioradialis, pt seated     Total time:                      Assessment: Tolerated treatment well  Patient demonstrated fatigue post treatment  Pt responded well to IASTM to R brachioradialis and instructed pt to use CP as needed x 5 min to R brachioradialis   Noted less R brachioradialis adhesions with Hawk  tool tonight for IASTM  Emphasis on postural awareness with all therex  Noted significant weakness with B scapular stabilization with UE therex and required constant manual cues to engage scapular stabilization with therex  Issued Green TB for HEP for scapular squeeze with ER     Plan: Continue per plan of care    Progress pt as per primary PT

## 2019-10-14 ENCOUNTER — APPOINTMENT (OUTPATIENT)
Dept: PHYSICAL THERAPY | Facility: CLINIC | Age: 59
End: 2019-10-14
Payer: COMMERCIAL

## 2019-10-16 ENCOUNTER — OFFICE VISIT (OUTPATIENT)
Dept: PHYSICAL THERAPY | Facility: CLINIC | Age: 59
End: 2019-10-16
Payer: COMMERCIAL

## 2019-10-16 DIAGNOSIS — M79.18 BRACHIORADIALIS MUSCLE TENDERNESS: Primary | ICD-10-CM

## 2019-10-16 PROCEDURE — 97140 MANUAL THERAPY 1/> REGIONS: CPT

## 2019-10-16 PROCEDURE — 97110 THERAPEUTIC EXERCISES: CPT

## 2019-10-16 NOTE — PROGRESS NOTES
Daily Note     Today's date: 10/16/2019  Patient name: Dionicio Mahajan  : 1960  MRN: [id number]  Referring provider: Sofia Moran DO  Dx:   Encounter Diagnosis     ICD-10-CM    1  Brachioradialis muscle tenderness M79 18                   Subjective: Sebastian Fernandez reports that his % improvement: 50%  Patient states he was on vacation and had no symptoms and returned to work and return of symptoms    Did stretching as ordered with no issues       Objective: See treatment diary below  Precautions none    Specialty Daily Treatment Diary       Insurance: 20%   (foto)    Visits: 1 2 3 4     Manual 10/2/19 10/7/19 10/9 10/16/19            tspine mobs: PA, rotation PA performed -              STM/ MI brachioradialis Next visit  IASTM to R brachioradialis x 8 min, pt seated IASTM to R brachioradialis x8 min, pt seated Manual STM wrist extensors;   manual wrist extensor stretch 10 sec x 5      Total Time:                 Exercise Diary         UBE/Nustep  UBE level 8/7 forward/back every 1 min UBE level 7 forward/nbackward every min UBE 5 min lv 6 8             Scap Isometrics Next visit   tricep kickback 5# 2x10 hold 5 5 lb 5 sec 2 x 10              Tubing rows  Blue TB 2x10 hold 5 Blue TB 2x10 hold 5 Blue 5 sec 2 x 10      Tubing ext  Green TB 2x10 Green TB 2x10 Green 2 x 10  5 sec     Aris scap retraction w/ER   Green TB 2x10 hold 5 Green 5 sec 2 x 10              Wrist extensor stretch  instruct   HEp    sidelying tspine rotation instruct       Sleeper stretch         hammer sup/pro: elbow flexed, elbow ext  Hammer curls 2x10 7#  seated Pronation/supination 2x10 3#  Hammer curls 2x10 7# seated pronation/supination 2x10 3# Pro/sup: hammer: mid shaft: 2 x 10    hammer curls: 7 lb  2 x 10      sitting posture instruct instructed  Discussed ergonomics for wrist posture with computer use    Wrist flex/ext w/ band     Wrist ext: red 2 x 10              Total time:                 Modalities        MH        Ice  5 min R brachioradialis 5 min R brachioradialis, pt seated deferred    Total time:           Reeval: 11/2/19    Assessment: patient had minimal to no tenderness in wrist extensors  Increased time spent discussing proper ergonomics for mouse use  patient reported he was sitting leaning forward, wrist resting on edge of desk and wrist postured in extended position  Discussed different options including removing arm rests to allow for patient to slide closer to keyboard  Patient verbalized good understanding of corrections that needed to be performed  No increase in pain after sessions: deferred ice  Plan: progress wrist strength, continue dialogue for wrok ergonomics

## 2019-10-18 ENCOUNTER — OFFICE VISIT (OUTPATIENT)
Dept: PHYSICAL THERAPY | Facility: CLINIC | Age: 59
End: 2019-10-18
Payer: COMMERCIAL

## 2019-10-18 DIAGNOSIS — M79.18 BRACHIORADIALIS MUSCLE TENDERNESS: Primary | ICD-10-CM

## 2019-10-18 PROCEDURE — 97140 MANUAL THERAPY 1/> REGIONS: CPT

## 2019-10-18 PROCEDURE — 97110 THERAPEUTIC EXERCISES: CPT

## 2019-10-18 NOTE — PROGRESS NOTES
Daily Note     Today's date: 10/18/2019  Patient name: Domenic Lan  : 1960  MRN: [id number]  Referring provider: Tre Ko DO  Dx:   Encounter Diagnosis     ICD-10-CM    1  Brachioradialis muscle tenderness M79 18                   Subjective: Margy Howe reports that his pain level is 0/10  States he can adjust chair at work by removing the arms         Objective: See treatment diary below    Precautions none    Specialty Daily Treatment Diary       Insurance: 20%   (foto)    Visits: 1 2 3 4  5   Manual 10/2/19 10/7/19 10/9 10/16/19 10/18/19           tspine mobs: PA, rotation PA performed -   performed           STM/ MI brachioradialis Next visit  IASTM to R brachioradialis x 8 min, pt seated IASTM to R brachioradialis x8 min, pt seated Manual STM wrist extensors;   manual wrist extensor stretch 10 sec x 5   STM wrist extensors: , wrist extensor stretch 10 sec x 5     Total Time:      8 min            Exercise Diary         UBE/Nustep  UBE level 8/7 forward/back every 1 min UBE level 7 forward/nbackward every min UBE 5 min lv 6 8  lv 7 5 min            Scap Isometrics Next visit   tricep kickback 5# 2x10 hold 5 5 lb 5 sec 2 x 10   5 lb 2 x 10             Tubing rows  Blue TB 2x10 hold 5 Blue TB 2x10 hold 5 Blue 5 sec 2 x 10   SLS unilateral blue 10 x each   Tubing ext  Green TB 2x10 Green TB 2x10 Green 2 x 10  5 sec  green with 2 steps back    Aris scap retraction w/ER   Green TB 2x10 hold 5 Green 5 sec 2 x 10   Green 5 sec x 10             Wrist extensor stretch  instruct   HEp --   sidelying tspine rotation instruct    5 sec x 10     Sleeper stretch         hammer sup/pro: elbow flexed, elbow ext  Hammer curls 2x10 7#  seated Pronation/supination 2x10 3#  Hammer curls 2x10 7# seated pronation/supination 2x10 3# Pro/sup: hammer: mid shaft: 2 x 10    hammer curls: 7 lb  2 x 10   Pro/sup: hammer: mid shaft: 2 x 10    hammer curls: 7 lb  2 x 10     sitting posture instruct instructed  Discussed ergonomics for wrist posture with computer use    Wrist flex/ext w/ band     Wrist ext: red 2 x 10   Ext: red 2 x 10             Total time:      15 min            Modalities        MH        Ice  5 min R brachioradialis 5 min R brachioradialis, pt seated deferred deferred   Total time:           Reeval: 11/2/19    Assessment:   Patient was pain free and less tenderness in area of wrist extensor musculature  Patient had no increase in pain with session  Still significant tightness noted in tspine joint mobs  Patient was challenged with SLS rowing  Plan: Progress treatment as tolerated

## 2019-10-21 ENCOUNTER — OFFICE VISIT (OUTPATIENT)
Dept: PHYSICAL THERAPY | Facility: CLINIC | Age: 59
End: 2019-10-21
Payer: COMMERCIAL

## 2019-10-21 DIAGNOSIS — M79.18 BRACHIORADIALIS MUSCLE TENDERNESS: Primary | ICD-10-CM

## 2019-10-21 PROCEDURE — 97140 MANUAL THERAPY 1/> REGIONS: CPT | Performed by: PHYSICAL THERAPIST

## 2019-10-21 PROCEDURE — 97110 THERAPEUTIC EXERCISES: CPT | Performed by: PHYSICAL THERAPIST

## 2019-10-21 NOTE — PROGRESS NOTES
Daily Note     Today's date: 10/21/2019  Patient name: Beth Venegas  : 1960  MRN: [id number]  Referring provider: Donnie Harris DO  Dx:   Encounter Diagnosis     ICD-10-CM    1  Brachioradialis muscle tenderness M79 18        Start Time: 1800  Stop Time: 1900  Total time in clinic (min): 60 minutes    Subjective: Eligio De Leon reports that his pain level is 0/10  States he adjusted his chair at work by removing the arms and now has full support of his wrists on desk  Pt states today was first day painfree at work         Objective: See treatment diary below    Precautions none    Specialty Daily Treatment Diary       Insurance: 20%   (foto)    Visits: 6 FOTO 3 4  5   Manual 10/21/19  10/9 10/16/19 10/18/19           tspine mobs: PA, rotation PA performed -   performed           STM/ MI brachioradialis IASTM to R brachioradialis x 8 min , pt seated IASTM to R brachioradialis x 8 min, pt seated IASTM to R brachioradialis x8 min, pt seated Manual STM wrist extensors;   manual wrist extensor stretch 10 sec x 5   STM wrist extensors: , wrist extensor stretch 10 sec x 5     Total Time:      8 min            Exercise Diary         UBE/Nustep Level 7 UBE foraward/back x 5 min UBE level 8/7 forward/back every 1 min UBE level 7 forward/nbackward every min UBE 5 min lv 6 8  lv 7 5 min            Scap Isometrics tricep kickback 7# 2x10  tricep kickback 5# 2x10 hold 5 5 lb 5 sec 2 x 10   5 lb 2 x 10             Tubing rows SLS unilateral BTB 2x10 hold 5 Blue TB 2x10 hold 5 Blue TB 2x10 hold 5 Blue 5 sec 2 x 10   SLS unilateral blue 10 x each   Tubing ext Blue TB 2x10 hold 5 Green TB 2x10 Green TB 2x10 Green 2 x 10  5 sec  green with 2 steps back    Aris scap retraction w/ER Green TB 2x10 hold 5  Green TB 2x10 hold 5 Green 5 sec 2 x 10   Green 5 sec x 10             Wrist extensor stretch  -   HEp --   sidelying tspine rotation 5 sec x 10 Bilateral    5 sec x 10     Sleeper stretch         hammer sup/pro: elbow flexed, elbow ext 8# 2x10 biceps/8# 2x10 hammer curls Hammer curls 2x10 7#  seated Pronation/supination 2x10 3#  Hammer curls 2x10 7# seated pronation/supination 2x10 3# Pro/sup: hammer: mid shaft: 2 x 10    hammer curls: 7 lb  2 x 10   Pro/sup: hammer: mid shaft: 2 x 10    hammer curls: 7 lb  2 x 10     sitting posture instruct instructed  Discussed ergonomics for wrist posture with computer use    Wrist flex/ext w/ band     Wrist ext: red 2 x 10   Ext: red 2 x 10             Total time:      15 min            Modalities        MH        Ice 7 min R brachioradialis 5 min R brachioradialis 5 min R brachioradialis, pt seated deferred deferred   Total time:           Reeval: 11/2/19    Assessment:   Patient was pain free and less tenderness in area of wrist extensor musculature  Patient had no increase in pain with session  Still significant tightness noted in tspine with rotation to R side  Plan: Progress treatment as tolerated  Increase reps as tolerated   FOTO NV

## 2019-10-23 ENCOUNTER — OFFICE VISIT (OUTPATIENT)
Dept: PHYSICAL THERAPY | Facility: CLINIC | Age: 59
End: 2019-10-23
Payer: COMMERCIAL

## 2019-10-23 DIAGNOSIS — M79.18 BRACHIORADIALIS MUSCLE TENDERNESS: Primary | ICD-10-CM

## 2019-10-23 PROCEDURE — G8985 CARRY GOAL STATUS: HCPCS | Performed by: PHYSICAL THERAPIST

## 2019-10-23 PROCEDURE — 97112 NEUROMUSCULAR REEDUCATION: CPT | Performed by: PHYSICAL THERAPIST

## 2019-10-23 PROCEDURE — 97110 THERAPEUTIC EXERCISES: CPT | Performed by: PHYSICAL THERAPIST

## 2019-10-23 PROCEDURE — G8984 CARRY CURRENT STATUS: HCPCS | Performed by: PHYSICAL THERAPIST

## 2019-10-23 NOTE — PROGRESS NOTES
Daily Note     Today's date: 10/23/2019  Patient name: Sarajane Snellen  : 1960  MRN: [id number]  Referring provider: Glenis Cantu DO  Dx:   Encounter Diagnosis     ICD-10-CM    1  Brachioradialis muscle tenderness M79 18        Start Time: 1800  Stop Time: 1900  Total time in clinic (min): 60 minutes    Subjective: Karen Cates reports that his pain level is 0/10    Pt states he remains painfree at work and pleased with progress       Objective: See treatment diary below    Precautions none    Specialty Daily Treatment Diary       Insurance: 20%   (foto)    Visits: 6  7   FOTO  4  5   Manual 10/21/19 10/23/19 10/9 10/16/19 10/18/19           tspine mobs: PA, rotation PA performed -   performed           STM/ MI brachioradialis IASTM to R brachioradialis x 8 min , pt seated IASTM to R brachioradialis x 8 min, pt seated IASTM to R brachioradialis x8 min, pt seated Manual STM wrist extensors;   manual wrist extensor stretch 10 sec x 5   STM wrist extensors: , wrist extensor stretch 10 sec x 5     Total Time:      8 min            Exercise Diary         UBE/Nustep Level 7 UBE foraward/back x 5 min UBE level 7 forward/back every 1 min UBE level 7 forward/nbackward every min UBE 5 min lv 6 8  lv 7 5 min            Scap Isometrics tricep kickback 7# 2x10 tricep kickback 7# 2x10 tricep kickback 5# 2x10 hold 5 5 lb 5 sec 2 x 10   5 lb 2 x 10             Tubing rows SLS unilateral BTB 2x10 hold 5 SLS unilateral Blue TB 2x15 hold 5 Blue TB 2x10 hold 5 Blue 5 sec 2 x 10   SLS unilateral blue 10 x each   Tubing ext Blue TB 2x10 hold 5 Blue TB x15, x10 on blue foam B LE Green TB 2x10 Green 2 x 10  5 sec  green with 2 steps back    Aris scap retraction w/ER Green TB 2x10 hold 5 Blue TB 2x15 hold 5 Green TB 2x10 hold 5 Green 5 sec 2 x 10   Green 5 sec x 10             Wrist extensor stretch  - Biceps curls 8# 2x15  HEp --   sidelying tspine rotation 5 sec x 10 Bilateral    5 sec x 10     Sleeper stretch         hammer sup/pro: elbow flexed, elbow ext 8# 2x10 biceps/8# 2x10 hammer curls Hammer curls 2x15 8#  seated Pronation/supination with 4# dowel  Hammer curls 2x10 7# seated pronation/supination 2x10 3# Pro/sup: hammer: mid shaft: 2 x 10    hammer curls: 7 lb  2 x 10   Pro/sup: hammer: mid shaft: 2 x 10    hammer curls: 7 lb  2 x 10     sitting posture instruct instructed  Discussed ergonomics for wrist posture with computer use    Wrist flex/ext w/ band     Wrist ext: red 2 x 10   Ext: red 2 x 10             Total time:      15 min            Modalities        MH        Ice 7 min R brachioradialis 5 min R brachioradialis 5 min R brachioradialis, pt seated deferred deferred   Total time:           Reeval: 11/2/19    Assessment:   Patient was pain free and less tenderness in area of wrist extensor musculature  Patient had no increase in pain with session  Still significant tightness noted in tspine with rotation to R side  Added increase reps today with pt reporting fatigue but no pain  Pt pleased with progress  FOTO performed with increase in score noted  Plan: Progress treatment as tolerated  Increase reps as tolerated

## 2019-10-28 ENCOUNTER — OFFICE VISIT (OUTPATIENT)
Dept: PHYSICAL THERAPY | Facility: CLINIC | Age: 59
End: 2019-10-28
Payer: COMMERCIAL

## 2019-10-28 DIAGNOSIS — M79.18 BRACHIORADIALIS MUSCLE TENDERNESS: Primary | ICD-10-CM

## 2019-10-28 PROCEDURE — 97140 MANUAL THERAPY 1/> REGIONS: CPT

## 2019-10-28 PROCEDURE — 97110 THERAPEUTIC EXERCISES: CPT

## 2019-10-28 NOTE — PROGRESS NOTES
Daily Note     Today's date: 10/28/2019  Patient name: Jerome Chadwick  : 1960  MRN: [id number]  Referring provider: Jacoby Ely DO  Dx:   Encounter Diagnosis     ICD-10-CM    1   Brachioradialis muscle tenderness M79 18        Start Time: 1730  Stop Time:   Total time in clinic (min): 45 minutes    Subjective:  0/10 pain right elbow/forearm; has not had pain for a while; removed arms on his work chair & now able to type without holding his wrists in extension for prolonged periods of time     Objective: See treatment diary below    Precautions none    Specialty Daily Treatment Diary       Insurance: 20%   (foto)    Visits: 6  7   FOTO 8  5   Manual 10/21/19 10/23/19 10/28  10/18/19           tspine mobs: PA, rotation PA performed -   performed           STM/ MI brachioradialis IASTM to R brachioradialis x 8 min , pt seated IASTM to R brachioradialis x 8 min, pt seated STM to  brachioradialis x8 min, pt seated Manual STM wrist extensors;   manual wrist extensor stretch 10 sec x 5   STM wrist extensors: , wrist extensor stretch 10 sec x 5     Total Time:      8 min            Exercise Diary         UBE/Nustep Level 7 UBE foraward/back x 5 min UBE level 7 forward/back every 1 min UBE level 7 forward/nbackward every min UBE 5 min lv 6 8  lv 7 5 min            Scap Isometrics tricep kickback 7# 2x10 tricep kickback 7# 2x10 tricep kickback 5#  2x10 hold 5 Resume 7#  5 lb 2 x 10             Tubing rows SLS unilateral BTB 2x10 hold 5 SLS unilateral Blue TB 2x15 hold 5 SLS unilateral   Blue TB 2 x 10 hold 5  Blue 5 sec 2 x 10   SLS unilateral blue 10 x each   Tubing ext Blue TB 2x10 hold 5 Blue TB x15, x10 on blue foam B LE Blue TB  2 x 10 hold 5  Green 2 x 10  5 sec  green with 2 steps back    Aris scap retraction w/ER Green TB 2x10 hold 5 Blue TB 2x15 hold 5 Green TB  1 x 10  hold 5 Green 5 sec 2 x 10   Green 5 sec x 10             Wrist extensor stretch  - Biceps curls 8# 2x15 Not today  HEp -- sidelying tspine rotation 5 sec x 10 Bilateral  5 sec x 10 bilateral   5 sec x 10     Sleeper stretch         hammer sup/pro: elbow flexed, elbow ext 8# 2x10 biceps/8# 2x10 hammer curls Hammer curls 2x15 8#  seated Pronation/supination with 4# dowel  Hammer curls 2x10 7# stance  pronation/supination 4 # 2 x 10  Pro/sup: hammer: mid shaft: 2 x 10    hammer curls: 7 lb  2 x 10   Pro/sup: hammer: mid shaft: 2 x 10    hammer curls: 7 lb  2 x 10     sitting posture instruct instructed  Discussed ergonomics for wrist posture with computer use    Wrist flex/ext w/ band     Wrist ext: red 2 x 10   Ext: red 2 x 10        Patient purchased lumbar roll      Total time:      15 min            Modalities        MH        Ice 7 min R brachioradialis 5 min R brachioradialis 5 min R brachioradialis, pt seated deferred deferred   Total time:           Reeval: 11/2/19      Assessment: Tolerated treatment well  Patient would benefit from continued PT; patient presents with minimal soft tissue restriction right wrist extensors; tolerating therex well for strengthening - no complaints of pain/discomfort with therex & ADL's including work environment; progressing toward DC next session       Plan: Continue per plan of care   x 1 session

## 2019-10-30 ENCOUNTER — OFFICE VISIT (OUTPATIENT)
Dept: PHYSICAL THERAPY | Facility: CLINIC | Age: 59
End: 2019-10-30
Payer: COMMERCIAL

## 2019-10-30 DIAGNOSIS — M79.18 BRACHIORADIALIS MUSCLE TENDERNESS: Primary | ICD-10-CM

## 2019-10-30 PROCEDURE — 97110 THERAPEUTIC EXERCISES: CPT

## 2019-10-30 PROCEDURE — 97530 THERAPEUTIC ACTIVITIES: CPT

## 2019-10-30 NOTE — PROGRESS NOTES
PT Discharge Summary     Today's date: 10/30/2019  Patient name: Luis Rhodes  : 1960  MRN: [id number]  Referring provider: Giovani Whiteside DO  Dx:   Encounter Diagnosis     ICD-10-CM    1  Brachioradialis muscle tenderness M79 18                   Subjective: Luiza Tang reports that his % improvement is 90%  Pain level at rest 0/10, with ADL's 0/10, at worst 0/10  Current symptoms include: none   Function: none, has not tried a long motorcycle ride          Objective: See treatment diary below  Precautions none    Specialty Daily Treatment Diary       Insurance: 20%   (foto)    Visits: 6  7   FOTO 8 9     Manual 10/21/19 10/23/19 10/28 10/30/19            tspine mobs: PA, rotation PA performed -              STM/ MI brachioradialis IASTM to R brachioradialis x 8 min , pt seated IASTM to R brachioradialis x 8 min, pt seated STM to  brachioradialis x8 min, pt seated     Total Time:                 Exercise Diary     reeval performed     UBE/Nustep Level 7 UBE foraward/back x 5 min UBE level 7 forward/back every 1 min UBE level 7 forward/nbackward every min UBE lv 7 5 min             Scap Isometrics tricep kickback 7# 2x10 tricep kickback 7# 2x10 tricep kickback 5#  2x10 hold 5 5 lb 2 x 10  Hold 5             Tubing rows SLS unilateral BTB 2x10 hold 5 SLS unilateral Blue TB 2x15 hold 5 SLS unilateral   Blue TB 2 x 10 hold 5  Blue 2 x 10      Tubing ext Blue TB 2x10 hold 5 Blue TB x15, x10 on blue foam B LE Blue TB  2 x 10 hold 5  Blue  5 sec x 10     Aris scap retraction w/ER Green TB 2x10 hold 5 Blue TB 2x15 hold 5 Green TB  1 x 10  hold 5 Green 5 sec x 10              Wrist extensor stretch  - Biceps curls 8# 2x15 Not today  8 lb 2 x 10     sidelying tspine rotation 5 sec x 10 Bilateral  5 sec x 10 bilateral      Sleeper stretch         hammer sup/pro: elbow flexed, elbow ext 8# 2x10 biceps/8# 2x10 hammer curls Hammer curls 2x15 8#  seated Pronation/supination with 4# dowel  Hammer curls 2x10 7# stance pronation/supination 4 # 2 x 10  8 lb 2 x  10    Pron/sup: 4 lb  2 x 10      sitting posture instruct instructed      Wrist flex/ext w/ band            Patient purchased lumbar roll      Total time:     40min             Modalities        MH        Ice 7 min R brachioradialis 5 min R brachioradialis 5 min R brachioradialis, pt seated ---    Total time:           Reeval: 11/2/19  Goals  STG  + Patient will have pain level 0/10 elbow  at rest after PT (2-3 weeks) (met)   + Patient will report a 40% improvement in symptoms (2-3 weeks) (met)   + Patient will be independent in basic HEP (2-3 weeks) (met)   + Patient will demonstrate good posture with verbal cuing   (2-3 weeks) (met)   + Patient will demonstrate an increase in range of motion shoulder Internal Rotation  to  By 10 degrees  (2-3 weeks) (NOT MET)   + Patient will report increased ease reaching with weight due to a reduction in pain (2-3 weeks) (met)   + Patient will increase tpsine mobility to wnl PA mobs ( 2 weeks) (NOT MET)   LTG  + Patient will have pain level 0/10 shoulder, elbow  with ADL's (4-6 weeks) (met)   + Patient will report a 75% improvement in symptoms (4-6 weeks)  (met)   + Patient will increase FOTO score by 10 points (8 sessions)  (met)   + Patient will be independent in comprehensive HEP (4-6 weeks) (met)   + Patient will demonstrate good posture independently  (4-6 weeks) (met)   + Patient will demonstrate an increase in range of motion shoulder Internal Rotation  to  Equal left (4-6 weeks) (NOT MET)   + Patient will demonstrate functional reaching behind back without pain (4-6 weeks) (met)   + Patient will report no functional limitations ( 4-6 weeks) (met)     Static Posture     Comments (10/30/19)  Shoulder:  Posture  Sitting: fair: (much improved awareness)   Standing: grossly wnl (status quo)     Cervical ROM:  Flexion:wnl    Extension:wnl    Rotation right:wnl   Rotation left:wnl   SB right:wnl   SB left: wnl     Palpation:no TTP in area of forearm musculature (status quo)     Special tests: + pain reproduced with wrist extension stretch  ( neg )      Functional reaching: (tested in standing)  Overhead: Right: WFL, painfree Left: WFL, painfree (wnl no pain )  Behind head: Right: WFL, painfree Left: WFL, painfree (wnl no pain)  Behind back :  Right: WFL, painful Left: WFL, painfree (Right: wnl no pain)    MMT  Flexion (supine): Right: 4/5 no pain  Left: 4/5 no pain ( wnl no pain)  Abduction (supine): Right: 4/5 no pain    Left: 4/5  no pain (wnl no pain)  ER: Right: 4/5 no pain   Left: 4/5 no pain  ( wnl no pain)  IR: Right: 4/5 + pain   Left: 4/5 no pain (  4/5 no pain)  Adduction: Right: 4/5 no pain    Left: 4/5 no pain   Extension: Right: 4/5  no pain   Left: 4/5 no pain     Bicep: Right: 4/5  + pain  Left: 4/5 no pain ( wnl no pain)  Tricep: Right: 4/5  no pain   Left: 4/5 no pain   ( wnl no pain)  : Left: 48 lb, 54 lb, 50 lb  Right: 49 lb, 51 lb, 50 lb  (52 lb, 52 lb , Left: 54 lb, 52 lb )   Pronation 4/5 painfree tho (status quo)   Supination: 4/5 painfree tho (status quo)     ROM  Flexion: Right: wnl  Left: wnl   Abduction: Right: wnl    Left: wnl   Er (supine 45 deg abd): Right: wnl    Left: wnl   IR (supine at 45 deg abd) : Right: 62   Left: 75  ( Right: 70; no pain)  Type II dysfunction: thoracic: rotation: bilaterally moderate LOM  ( Right: mod LOM, Left: min LOM)    tspine mobs: PA; fair mobility (status quo)   Assessment: patient has made signficant gains since [facility]  He denies any functional limitations  Patient has regained MMT, abolished pain and demonstrates good working knowledge of proper work ergonomics and HEP  At this time patient no longer requires skilled PT  Patient advised to contact clinic or physician if symptoms return and to hold HEP         Plan: d/c to HEp

## 2020-05-21 LAB
ALBUMIN SERPL-MCNC: 4.5 G/DL (ref 3.8–4.9)
ALBUMIN/GLOB SERPL: 2 {RATIO} (ref 1.2–2.2)
ALP SERPL-CCNC: 89 IU/L (ref 39–117)
ALT SERPL-CCNC: 21 IU/L (ref 0–44)
AST SERPL-CCNC: 28 IU/L (ref 0–40)
BILIRUB SERPL-MCNC: 0.4 MG/DL (ref 0–1.2)
BUN SERPL-MCNC: 12 MG/DL (ref 8–27)
BUN/CREAT SERPL: 14 (ref 10–24)
CALCIUM SERPL-MCNC: 9.1 MG/DL (ref 8.6–10.2)
CHLORIDE SERPL-SCNC: 101 MMOL/L (ref 96–106)
CHOLEST SERPL-MCNC: 217 MG/DL (ref 100–199)
CO2 SERPL-SCNC: 25 MMOL/L (ref 20–29)
CREAT SERPL-MCNC: 0.88 MG/DL (ref 0.76–1.27)
GLOBULIN SER-MCNC: 2.2 G/DL (ref 1.5–4.5)
GLUCOSE SERPL-MCNC: 97 MG/DL (ref 65–99)
HBA1C MFR BLD: 5.6 % (ref 4.8–5.6)
HDLC SERPL-MCNC: 59 MG/DL
LDLC SERPL CALC-MCNC: 140 MG/DL (ref 0–99)
MICRODELETION SYND BLD/T FISH: NORMAL
POTASSIUM SERPL-SCNC: 4.3 MMOL/L (ref 3.5–5.2)
PROT SERPL-MCNC: 6.7 G/DL (ref 6–8.5)
SL AMB EGFR AFRICAN AMERICAN: 108 ML/MIN/1.73
SL AMB EGFR NON AFRICAN AMERICAN: 93 ML/MIN/1.73
SODIUM SERPL-SCNC: 139 MMOL/L (ref 134–144)
TRIGL SERPL-MCNC: 91 MG/DL (ref 0–149)

## 2020-06-05 ENCOUNTER — OFFICE VISIT (OUTPATIENT)
Dept: FAMILY MEDICINE CLINIC | Facility: CLINIC | Age: 60
End: 2020-06-05
Payer: COMMERCIAL

## 2020-06-05 ENCOUNTER — TELEPHONE (OUTPATIENT)
Dept: FAMILY MEDICINE CLINIC | Facility: CLINIC | Age: 60
End: 2020-06-05

## 2020-06-05 VITALS
WEIGHT: 152 LBS | SYSTOLIC BLOOD PRESSURE: 120 MMHG | RESPIRATION RATE: 16 BRPM | OXYGEN SATURATION: 97 % | HEIGHT: 67 IN | BODY MASS INDEX: 23.86 KG/M2 | TEMPERATURE: 97.7 F | DIASTOLIC BLOOD PRESSURE: 78 MMHG | HEART RATE: 63 BPM

## 2020-06-05 DIAGNOSIS — Z91.89 FRAMINGHAM CARDIAC RISK 10-20% IN NEXT 10 YEARS: ICD-10-CM

## 2020-06-05 DIAGNOSIS — G47.00 PERSISTENT INSOMNIA: ICD-10-CM

## 2020-06-05 DIAGNOSIS — K40.90 LEFT INGUINAL HERNIA: ICD-10-CM

## 2020-06-05 DIAGNOSIS — Z00.00 HEALTHCARE MAINTENANCE: Primary | ICD-10-CM

## 2020-06-05 DIAGNOSIS — M79.601 PAIN IN RIGHT ARM: ICD-10-CM

## 2020-06-05 PROCEDURE — 99396 PREV VISIT EST AGE 40-64: CPT | Performed by: FAMILY MEDICINE

## 2020-06-05 RX ORDER — ZOLPIDEM TARTRATE 5 MG/1
5 TABLET ORAL
Qty: 30 TABLET | Refills: 3 | Status: SHIPPED | OUTPATIENT
Start: 2020-06-05 | End: 2022-02-15 | Stop reason: SDUPTHER

## 2020-06-16 ENCOUNTER — TELEPHONE (OUTPATIENT)
Dept: FAMILY MEDICINE CLINIC | Facility: CLINIC | Age: 60
End: 2020-06-16

## 2020-06-17 ENCOUNTER — APPOINTMENT (OUTPATIENT)
Dept: RADIOLOGY | Facility: CLINIC | Age: 60
End: 2020-06-17
Payer: COMMERCIAL

## 2020-06-17 ENCOUNTER — CONSULT (OUTPATIENT)
Dept: OBGYN CLINIC | Facility: CLINIC | Age: 60
End: 2020-06-17
Payer: COMMERCIAL

## 2020-06-17 VITALS
BODY MASS INDEX: 24.2 KG/M2 | WEIGHT: 154.2 LBS | HEIGHT: 67 IN | DIASTOLIC BLOOD PRESSURE: 87 MMHG | HEART RATE: 60 BPM | SYSTOLIC BLOOD PRESSURE: 146 MMHG

## 2020-06-17 DIAGNOSIS — M79.601 PAIN IN RIGHT ARM: ICD-10-CM

## 2020-06-17 DIAGNOSIS — S56.911A FOREARM STRAIN, RIGHT, INITIAL ENCOUNTER: Primary | ICD-10-CM

## 2020-06-17 DIAGNOSIS — M25.521 PAIN IN RIGHT ELBOW: ICD-10-CM

## 2020-06-17 PROCEDURE — 3008F BODY MASS INDEX DOCD: CPT | Performed by: ORTHOPAEDIC SURGERY

## 2020-06-17 PROCEDURE — 73080 X-RAY EXAM OF ELBOW: CPT

## 2020-06-17 PROCEDURE — 99203 OFFICE O/P NEW LOW 30 MIN: CPT | Performed by: ORTHOPAEDIC SURGERY

## 2020-09-26 ENCOUNTER — TELEMEDICINE (OUTPATIENT)
Dept: FAMILY MEDICINE CLINIC | Facility: CLINIC | Age: 60
End: 2020-09-26
Payer: COMMERCIAL

## 2020-09-26 DIAGNOSIS — J30.9 ALLERGIC RHINITIS, UNSPECIFIED SEASONALITY, UNSPECIFIED TRIGGER: Primary | ICD-10-CM

## 2020-09-26 PROCEDURE — 1036F TOBACCO NON-USER: CPT | Performed by: NURSE PRACTITIONER

## 2020-09-26 PROCEDURE — 99213 OFFICE O/P EST LOW 20 MIN: CPT | Performed by: NURSE PRACTITIONER

## 2020-09-26 NOTE — PROGRESS NOTES
Virtual Brief Visit    Assessment/Plan:    Recommended he start Zyrtec D and Flonase nasal spray daily  Advised he should be evaluated in the office in 1-2 weeks if no improvement or worsening symptoms  Problem List Items Addressed This Visit     None      Visit Diagnoses     Allergic rhinitis, unspecified seasonality, unspecified trigger    -  Primary                Reason for visit is   Chief Complaint   Patient presents with    Virtual Brief Visit        Encounter provider SCARLET Jones    Provider located at  O  [address]  [place] [id number]    Recent Visits  No visits were found meeting these conditions  Showing recent visits within past 7 days and meeting all other requirements     Today's Visits  Date Type Provider Dept   09/26/20 Telemedicine Andrew Jones today's visits and meeting all other requirements     Future Appointments  No visits were found meeting these conditions  Showing future appointments within next 150 days and meeting all other requirements        After connecting through telephone, the patient was identified by name and date of birth  Sachin Lott was informed that this is a telemedicine visit and that the visit is being conducted through telephone  My office door was closed  No one else was in the room  He acknowledged consent and understanding of privacy and security of the platform  The patient has agreed to participate and understands he can discontinue the visit at any time  Patient is aware this is a billable service  Subjective    Sachin Lott is a 60 y o  male   For the last couple of weeks, he has been experiencing sinus congestion, which he attributes to allergies  Sounds nasal, but no nasal drainage  Denies any ear discomfort, sore or scratchy throat, or itching or painful ears     Denies fever,  cough, chest tightness, SOB   He is not taking anything OTC for his symptoms  He is visiting his young granddaughter in a couple of weeks and wants to make sure he is not contagious  History reviewed  No pertinent past medical history  Past Surgical History:   Procedure Laterality Date    HERNIA REPAIR  1998       Current Outpatient Medications   Medication Sig Dispense Refill    aspirin 81 MG tablet Take by mouth daily      zolpidem (AMBIEN) 5 mg tablet Take 1 tablet (5 mg total) by mouth daily at bedtime as needed for sleep 30 tablet 3     No current facility-administered medications for this visit  No Known Allergies    Review of Systems   Constitutional: Negative for chills, fatigue and fever  HENT: Positive for congestion  Negative for ear pain, postnasal drip, rhinorrhea, sinus pressure and sore throat  Respiratory: Negative for cough, shortness of breath and wheezing  Cardiovascular: Negative for chest pain  Gastrointestinal: Negative for abdominal pain, diarrhea, nausea and vomiting  Musculoskeletal: Negative for arthralgias  Skin: Negative for rash  Neurological: Negative for headaches  There were no vitals filed for this visit  I spent 7 minutes directly with the patient during this visit    VIRTUAL VISIT [facility] acknowledges that he has consented to an online visit or consultation  He understands that the online visit is based solely on information provided by him, and that, in the absence of a face-to-face physical evaluation by the physician, the diagnosis he receives is both limited and provisional in terms of accuracy and completeness  This is not intended to replace a full medical face-to-face evaluation by the physician  Phil Pepper understands and accepts these terms

## 2021-06-02 ENCOUNTER — TELEPHONE (OUTPATIENT)
Dept: FAMILY MEDICINE CLINIC | Facility: CLINIC | Age: 61
End: 2021-06-02

## 2021-06-02 NOTE — TELEPHONE ENCOUNTER
DR Venice Buck     Patient needs lab orders for a physical   He would also like you to add the antibodies test   I did tell him to make sure its covered by insurance  Please advise

## 2021-06-04 DIAGNOSIS — Z13.6 SCREENING FOR CARDIOVASCULAR, RESPIRATORY, AND GENITOURINARY DISEASES: Primary | ICD-10-CM

## 2021-06-04 DIAGNOSIS — Z13.83 SCREENING FOR CARDIOVASCULAR, RESPIRATORY, AND GENITOURINARY DISEASES: Primary | ICD-10-CM

## 2021-06-04 DIAGNOSIS — Z00.00 HEALTHCARE MAINTENANCE: ICD-10-CM

## 2021-06-04 DIAGNOSIS — Z13.89 SCREENING FOR CARDIOVASCULAR, RESPIRATORY, AND GENITOURINARY DISEASES: Primary | ICD-10-CM

## 2021-06-04 DIAGNOSIS — Z13.1 SCREENING FOR DIABETES MELLITUS (DM): ICD-10-CM

## 2021-07-05 LAB
ALBUMIN SERPL-MCNC: 4.8 G/DL (ref 3.8–4.8)
ALBUMIN/GLOB SERPL: 2 {RATIO} (ref 1.2–2.2)
ALP SERPL-CCNC: 93 IU/L (ref 48–121)
ALT SERPL-CCNC: 19 IU/L (ref 0–44)
AST SERPL-CCNC: 26 IU/L (ref 0–40)
BILIRUB SERPL-MCNC: 0.3 MG/DL (ref 0–1.2)
BUN SERPL-MCNC: 19 MG/DL (ref 8–27)
BUN/CREAT SERPL: 23 (ref 10–24)
CALCIUM SERPL-MCNC: 9.3 MG/DL (ref 8.6–10.2)
CHLORIDE SERPL-SCNC: 102 MMOL/L (ref 96–106)
CHOLEST SERPL-MCNC: 209 MG/DL (ref 100–199)
CO2 SERPL-SCNC: 25 MMOL/L (ref 20–29)
CREAT SERPL-MCNC: 0.83 MG/DL (ref 0.76–1.27)
GLOBULIN SER-MCNC: 2.4 G/DL (ref 1.5–4.5)
GLUCOSE SERPL-MCNC: 112 MG/DL (ref 65–99)
HDLC SERPL-MCNC: 59 MG/DL
LDLC SERPL CALC-MCNC: 138 MG/DL (ref 0–99)
MICRODELETION SYND BLD/T FISH: NORMAL
POTASSIUM SERPL-SCNC: 4.7 MMOL/L (ref 3.5–5.2)
PROT SERPL-MCNC: 7.2 G/DL (ref 6–8.5)
SARS-COV-2 IGA SERPL QL IA: NEGATIVE
SARS-COV-2 IGG SERPL QL IA: POSITIVE
SARS-COV-2 IGM SERPL QL IA: NEGATIVE
SL AMB EGFR AFRICAN AMERICAN: 110 ML/MIN/1.73
SL AMB EGFR NON AFRICAN AMERICAN: 95 ML/MIN/1.73
SODIUM SERPL-SCNC: 141 MMOL/L (ref 134–144)
TRIGL SERPL-MCNC: 68 MG/DL (ref 0–149)

## 2022-02-04 ENCOUNTER — TELEPHONE (OUTPATIENT)
Dept: FAMILY MEDICINE CLINIC | Facility: CLINIC | Age: 62
End: 2022-02-04

## 2022-02-04 DIAGNOSIS — R73.03 PREDIABETES: Primary | ICD-10-CM

## 2022-02-10 LAB
BUN SERPL-MCNC: 11 MG/DL (ref 8–27)
BUN/CREAT SERPL: 13 (ref 10–24)
CALCIUM SERPL-MCNC: 9.5 MG/DL (ref 8.6–10.2)
CHLORIDE SERPL-SCNC: 100 MMOL/L (ref 96–106)
CO2 SERPL-SCNC: 25 MMOL/L (ref 20–29)
CREAT SERPL-MCNC: 0.85 MG/DL (ref 0.76–1.27)
GLUCOSE SERPL-MCNC: 108 MG/DL (ref 65–99)
HBA1C MFR BLD: 6 % (ref 4.8–5.6)
POTASSIUM SERPL-SCNC: 4.3 MMOL/L (ref 3.5–5.2)
SL AMB EGFR AFRICAN AMERICAN: 108 ML/MIN/1.73
SL AMB EGFR NON AFRICAN AMERICAN: 93 ML/MIN/1.73
SODIUM SERPL-SCNC: 139 MMOL/L (ref 134–144)

## 2022-02-12 PROBLEM — Z86.19 HISTORY OF LYME DISEASE: Status: ACTIVE | Noted: 2017-09-17

## 2022-02-15 ENCOUNTER — OFFICE VISIT (OUTPATIENT)
Dept: FAMILY MEDICINE CLINIC | Facility: CLINIC | Age: 62
End: 2022-02-15
Payer: COMMERCIAL

## 2022-02-15 VITALS
RESPIRATION RATE: 16 BRPM | SYSTOLIC BLOOD PRESSURE: 138 MMHG | TEMPERATURE: 97.3 F | HEART RATE: 66 BPM | DIASTOLIC BLOOD PRESSURE: 80 MMHG | BODY MASS INDEX: 25.01 KG/M2 | OXYGEN SATURATION: 98 % | HEIGHT: 68 IN | WEIGHT: 165 LBS

## 2022-02-15 DIAGNOSIS — G47.00 PERSISTENT INSOMNIA: ICD-10-CM

## 2022-02-15 DIAGNOSIS — R73.03 PREDIABETES: ICD-10-CM

## 2022-02-15 DIAGNOSIS — K64.4 RESIDUAL HEMORRHOID TAGS: ICD-10-CM

## 2022-02-15 DIAGNOSIS — K64.8 INTERNAL HEMORRHOID, BLEEDING: ICD-10-CM

## 2022-02-15 DIAGNOSIS — Z00.00 HEALTHCARE MAINTENANCE: Primary | ICD-10-CM

## 2022-02-15 DIAGNOSIS — K40.90 LEFT INGUINAL HERNIA: ICD-10-CM

## 2022-02-15 PROCEDURE — 99396 PREV VISIT EST AGE 40-64: CPT | Performed by: FAMILY MEDICINE

## 2022-02-15 PROCEDURE — 3008F BODY MASS INDEX DOCD: CPT | Performed by: FAMILY MEDICINE

## 2022-02-15 PROCEDURE — 1036F TOBACCO NON-USER: CPT | Performed by: FAMILY MEDICINE

## 2022-02-15 PROCEDURE — 3725F SCREEN DEPRESSION PERFORMED: CPT | Performed by: FAMILY MEDICINE

## 2022-02-15 RX ORDER — ZOLPIDEM TARTRATE 5 MG/1
5 TABLET ORAL
Qty: 30 TABLET | Refills: 1 | Status: SHIPPED | OUTPATIENT
Start: 2022-02-15

## 2022-02-16 NOTE — PROGRESS NOTES
Assessment/Plan:    No problem-specific Assessment & Plan notes found for this encounter  cpe  Surgical referral for left inguinal hernia when ready  GI referral for hemorrhoids possible banding  Insomnia ambien risks aware, if needs refills more than 1x/year then will suggest q6m f/u  Diet for prediabetes advised     Diagnoses and all orders for this visit:    Healthcare maintenance    Left inguinal hernia  -     Ambulatory referral to General Surgery; Future    Internal hemorrhoid, bleeding  -     Ambulatory referral to Gastroenterology; Future    Residual hemorrhoid tags  -     Ambulatory referral to Gastroenterology; Future    Persistent insomnia  -     zolpidem (AMBIEN) 5 mg tablet; Take 1 tablet (5 mg total) by mouth daily at bedtime as needed for sleep    Prediabetes        Return if symptoms worsen or fail to improve  Subjective:      Patient ID: Avery Graves is a 57 y o  male  Chief Complaint   Patient presents with    Physical Exam       HPI  2w ago noted a lump on anus, buibble? Soft  Here for cpe  Noted some bright red blood in tissue, bm was brown  No pain noted  Bowels regular  Had colonoscopy July 2018 Dr Guevara Chou with int hemorrhoids noted  Labs reviewed  Has has some wt gain  Walks for exercise   13# gain in 2y    The following portions of the patient's history were reviewed and updated as appropriate: allergies, current medications, past family history, past medical history, past social history, past surgical history and problem list     Review of Systems   Constitutional: Negative for chills and fever  Current Outpatient Medications   Medication Sig Dispense Refill    zolpidem (AMBIEN) 5 mg tablet Take 1 tablet (5 mg total) by mouth daily at bedtime as needed for sleep 30 tablet 1     No current facility-administered medications for this visit         Objective:    /80   Pulse 66   Temp (!) 97 3 °F (36 3 °C)   Resp 16   Ht 5' 8" (1 727 m)   Wt 74 8 kg (165 lb) SpO2 98%   BMI 25 09 kg/m²        Physical Exam  Vitals and nursing note reviewed  Constitutional:       General: He is not in acute distress  Appearance: He is well-developed  He is not ill-appearing  HENT:      Head: Normocephalic  Right Ear: Tympanic membrane normal       Left Ear: Tympanic membrane normal    Eyes:      General: No scleral icterus  Conjunctiva/sclera: Conjunctivae normal    Neck:      Vascular: No carotid bruit  Cardiovascular:      Rate and Rhythm: Normal rate and regular rhythm  Heart sounds: No murmur heard  Pulmonary:      Effort: Pulmonary effort is normal  No respiratory distress  Breath sounds: No wheezing  Abdominal:      General: There is no distension  Palpations: Abdomen is soft  Tenderness: There is no abdominal tenderness  Hernia: A hernia is present  Hernia is present in the left inguinal area  There is no hernia in the right inguinal area  Musculoskeletal:         General: No deformity  Cervical back: Neck supple  Right lower leg: No edema  Left lower leg: No edema  Skin:     General: Skin is warm and dry  Coloration: Skin is not pale  Neurological:      Mental Status: He is alert  Motor: No weakness  Psychiatric:         Mood and Affect: Mood normal          Behavior: Behavior normal          Thought Content: Thought content normal        BMI Counseling: Body mass index is 25 09 kg/m²  The BMI is above normal  Nutrition recommendations include decreasing portion sizes and moderation in carbohydrate intake  Exercise recommendations include exercising 3-5 times per week  No pharmacotherapy was ordered  Rationale for BMI follow-up plan is due to patient being overweight or obese  Depression Screening and Follow-up Plan: Patient was screened for depression during today's encounter  They screened negative with a PHQ-2 score of 0               Ana Maria Rowley DO

## 2022-03-03 ENCOUNTER — CONSULT (OUTPATIENT)
Dept: SURGERY | Facility: CLINIC | Age: 62
End: 2022-03-03
Payer: COMMERCIAL

## 2022-03-03 VITALS
TEMPERATURE: 97.5 F | WEIGHT: 160.6 LBS | SYSTOLIC BLOOD PRESSURE: 134 MMHG | OXYGEN SATURATION: 97 % | HEART RATE: 70 BPM | DIASTOLIC BLOOD PRESSURE: 78 MMHG | HEIGHT: 68 IN | BODY MASS INDEX: 24.34 KG/M2

## 2022-03-03 DIAGNOSIS — K40.90 LEFT INGUINAL HERNIA: ICD-10-CM

## 2022-03-03 PROCEDURE — 99204 OFFICE O/P NEW MOD 45 MIN: CPT | Performed by: SPECIALIST

## 2022-03-03 NOTE — PROGRESS NOTES
History and Physical Examination - General Surgery   [facility]  Carroll Mathew 57 y o  male MRN: [id number]  Unit/Bed#:  Encounter: [id number] PCP: Chelsi Joy DO    History of Present Illness   Chief Complaint:  Left groin swelling    HPI:  Carroll Mathew is a 57 y o  male who presents to office with left groin swelling and possible left inguinal hernia recurrent I am here for possible surgery  I have though no pain no symptoms and this was identified during my regular yearly visit   I have a bilateral inguinal hernia repair in 1998 possible without mesh  And then I have a recurrent right inguinal hernia which was fixed 5 years ago with mesh    Patient denies any history of constipation diarrhea denies any history of lifting at work place he does work heavy work at home and does projects requires lifting heavy objects  Denies any symptoms of a urinary problem  Denies any history of cough expectoration  Patient is nonsmoker    Denies any pain in the left groin or nausea vomiting diarrhea constipation  Patient has a milder COVID infection in March the last year  Patient is vaccinated with COVID    Historical Information   The following portions of the patient's history were reviewed and updated as appropriate  History reviewed  No pertinent past medical history    Past Surgical History:   Procedure Laterality Date    COLONOSCOPY      HERNIA REPAIR  1998     Social History   Social History     Substance and Sexual Activity   Alcohol Use Yes    Comment: Daily     Social History     Substance and Sexual Activity   Drug Use No     Social History     Tobacco Use   Smoking Status Never Smoker   Smokeless Tobacco Never Used     Family History:   Family History   Problem Relation Age of Onset    Hypertension Mother     Hyperlipidemia Father        Meds/Allergies   No Known Allergies    Current Outpatient Medications:     zolpidem (AMBIEN) 5 mg tablet, Take 1 tablet (5 mg total) by mouth daily at bedtime as needed for sleep, Disp: 30 tablet, Rfl: 1     REVIEW OF SYSTEMS  Constitutional:  Denies fever or chills   Eyes:  Denies change in visual acuity   HENT:  Denies nasal congestion or sore throat   Respiratory:  Denies cough or shortness of breath   Cardiovascular:  Denies chest pain or edema   GI:  Denies abdominal pain, nausea, vomiting, bloody stools or diarrhea   :  Denies dysuria, frequency, difficulty in micturition and nocturia  Musculoskeletal:  Denies back pain or joint pain   Neurologic:  Denies headache, focal weakness or sensory changes   Endocrine:  Denies polyuria or polydipsia   Lymphatic:  Denies swollen glands   Psychiatric:  Denies depression or anxiety     Objective   Current Vitals:   /78 (BP Location: Right arm, Patient Position: Sitting, Cuff Size: Large)   Pulse 70   Temp 97 5 °F (36 4 °C) (Core)   Ht 5' 8" (1 727 m)   Wt 72 8 kg (160 lb 9 6 oz)   SpO2 97%   BMI 24 42 kg/m²   Body mass index is 24 42 kg/m²  PHYSICAL EXAMS  General:  Patient is not in acute distress, laying in the bed comfortably, awake, alert responding to commands,   HEENT:  Both pupils normal-size atraumatic, normocephalic, nonicteric  Neck:  JVP not raised  Trachea central  Respiratory:  normal Breath sounds clear to auscultation,  Cardiovascular:  S1-S2 normal without any murmur   GI:  Abdomen soft nontender     Bilateral lower groin scar from previous surgery  Bilateral groin bulging/minimal cough impulse on the left side  Both testicles appropriate to his age nontender  Musculoskeletal:  No back pain  Integument:  No skin rashes or ulceration  Lymphatic:  No cervical or inguinal lymphadenopathy  Neurologic:  Patient is awake alert, responding to command, well-oriented to time and place and person moving all extremities ambulating well    Visit Diagnosis:   Diagnoses and all orders for this visit:    Left inguinal hernia  -     Ambulatory referral to General Surgery       Plan of care was discussed with patient in detail    Pertinent labs reviewed  Pertinent images and available reads personally reviewed  No results found  Pertinent notes reviewed    Assessment/Plan   Assessment:  Possible recurrent left inguinal hernia  Asymptomatic incidental finding or physical examination    Plan:    The risks, benefits, alternatives,and probabilities of success were discussed in detail with no guarantee made as to outcome  All questions were answered to the patient's satisfaction  ultrasound left groin follow-up in 2 weeks for possible to schedule for surgery    Counseling / Coordination of Care  Expained patient  in detailed about coordination of care  A description of the counseling / coordination of care:  I performed an interim history, pertinent images and labs, performed a physical examination to arrive at the plan delineated above with associated thought processes  Dr Lilly Blankenship MD [facility]    Office   Tel  [phone]  Fax   [phone]

## 2022-03-08 ENCOUNTER — OFFICE VISIT (OUTPATIENT)
Dept: OBGYN CLINIC | Facility: CLINIC | Age: 62
End: 2022-03-08
Payer: COMMERCIAL

## 2022-03-08 ENCOUNTER — APPOINTMENT (OUTPATIENT)
Dept: RADIOLOGY | Facility: CLINIC | Age: 62
End: 2022-03-08
Payer: COMMERCIAL

## 2022-03-08 VITALS
SYSTOLIC BLOOD PRESSURE: 167 MMHG | HEIGHT: 68 IN | WEIGHT: 158.8 LBS | HEART RATE: 56 BPM | DIASTOLIC BLOOD PRESSURE: 95 MMHG | BODY MASS INDEX: 24.07 KG/M2

## 2022-03-08 DIAGNOSIS — M25.511 RIGHT SHOULDER PAIN, UNSPECIFIED CHRONICITY: ICD-10-CM

## 2022-03-08 DIAGNOSIS — M75.81 ROTATOR CUFF TENDINITIS, RIGHT: Primary | ICD-10-CM

## 2022-03-08 DIAGNOSIS — M75.51 SUBACROMIAL BURSITIS OF RIGHT SHOULDER JOINT: ICD-10-CM

## 2022-03-08 PROCEDURE — 73030 X-RAY EXAM OF SHOULDER: CPT

## 2022-03-08 PROCEDURE — 99214 OFFICE O/P EST MOD 30 MIN: CPT | Performed by: ORTHOPAEDIC SURGERY

## 2022-03-08 NOTE — PROGRESS NOTES
Assessment/Plan:  1  Rotator cuff tendinitis, right  Ambulatory Referral to Physical Therapy   2  Subacromial bursitis of right shoulder joint  Ambulatory Referral to Physical Therapy   3  Right shoulder pain, unspecified chronicity  XR shoulder 2+ vw right       Scribe Attestation    I,:  Rosie Hoyos am acting as a scribe while in the presence of the attending physician :       I,:  Sofya Srivastava MD personally performed the services described in this documentation    as scribed in my presence :           Upon evaluation and review of the right shoulder xrays taken today, I believe that Damaso is presenting today with signs and symptoms consistent with rotator cuff tendinitis and subacromial bursitis  I discussed with Damaso that for treatment of this condition, he has several options including but not limited to: ice and heat versus tylenol and advil versus formal physical therapy versus a steroid injection  I discussed with Damaso that I believe physical therapy could help to improve his condition, decrease his pain, and increase his function  Damaso was agreeable to this and I provided him with an order for physical therapy today  I will follow up with Damaso again in 7 weeks for repeat evaluation of his right shoulder since he will be taking a trip to [place] starting tomorrow  Subjective:   Dina Muñoz is a 57 y o  male who presents to the office today for abdullahi;uation of his right shoulder  Damaso states that he has been experiencing right shoulder pain for several years  He notes that over the last few weeks, he has experienced an increase in his right shoulder pain which has been waking him up at night  He states that he experiences increased pain with performing activities that involve a circular motion about his right shoulder or with reaching over his head  He also notices his pain when he rolls onto his right side when he is laying in bed    He states that he is still able to complete most of his activities of daily living without exacerbation of his symptoms  Review of Systems   Constitutional: Negative for chills and fever  HENT: Negative for ear pain and sore throat  Eyes: Negative for pain and visual disturbance  Respiratory: Negative for cough and shortness of breath  Cardiovascular: Negative for chest pain and palpitations  Gastrointestinal: Negative for abdominal pain and vomiting  Genitourinary: Negative for dysuria and hematuria  Musculoskeletal: Positive for arthralgias  Negative for back pain  Skin: Negative for color change and rash  Neurological: Negative for seizures and syncope  All other systems reviewed and are negative  History reviewed  No pertinent past medical history  Past Surgical History:   Procedure Laterality Date    COLONOSCOPY      HERNIA REPAIR  1998       Family History   Problem Relation Age of Onset    Hypertension Mother     Hyperlipidemia Father        Social History     Occupational History    Not on file   Tobacco Use    Smoking status: Never Smoker    Smokeless tobacco: Never Used   Vaping Use    Vaping Use: Never used   Substance and Sexual Activity    Alcohol use: Yes     Comment: Daily    Drug use: No    Sexual activity: Not on file         Current Outpatient Medications:     zolpidem (AMBIEN) 5 mg tablet, Take 1 tablet (5 mg total) by mouth daily at bedtime as needed for sleep, Disp: 30 tablet, Rfl: 1    No Known Allergies    Objective:  Vitals:    03/08/22 0750   BP: 167/95   Pulse: 56       Right Shoulder Exam     Range of Motion   External rotation: 80   Forward flexion: 150   Internal rotation 0 degrees: L1     Muscle Strength   Abduction: 5/5   Internal rotation: 5/5   External rotation: 5/5     Tests   Pham test: positive  Impingement: positive    Other   Sensation: normal    Comments:  Discomfort with abduction strength testing                Physical Exam  HENT:      Head: Normocephalic and atraumatic  Eyes:      General:         Right eye: No discharge  Left eye: No discharge  Extraocular Movements: Extraocular movements intact  Conjunctiva/sclera: Conjunctivae normal    Cardiovascular:      Rate and Rhythm: Normal rate  Pulmonary:      Effort: Pulmonary effort is normal  No respiratory distress  Musculoskeletal:      Cervical back: Normal range of motion and neck supple  Skin:     General: Skin is warm and dry  Neurological:      Mental Status: He is alert and oriented to person, place, and time  Psychiatric:         Mood and Affect: Mood normal          Behavior: Behavior normal          I have personally reviewed pertinent films in PACS and my interpretation is as follows:    Review of the right shoulder xrays taken today demonstrate no obvious signs of osseous abnormalities, deformities, or malalignments

## 2022-03-14 ENCOUNTER — HOSPITAL ENCOUNTER (OUTPATIENT)
Dept: RADIOLOGY | Facility: HOSPITAL | Age: 62
Discharge: HOME | End: 2022-03-14
Attending: SPECIALIST
Payer: COMMERCIAL

## 2022-03-14 DIAGNOSIS — K40.90 LEFT INGUINAL HERNIA: ICD-10-CM

## 2022-03-14 PROCEDURE — 76705 ECHO EXAM OF ABDOMEN: CPT

## 2022-03-21 ENCOUNTER — CONSULT (OUTPATIENT)
Dept: GASTROENTEROLOGY | Facility: CLINIC | Age: 62
End: 2022-03-21
Payer: COMMERCIAL

## 2022-03-21 VITALS
HEART RATE: 67 BPM | SYSTOLIC BLOOD PRESSURE: 145 MMHG | BODY MASS INDEX: 24.25 KG/M2 | HEIGHT: 68 IN | WEIGHT: 160 LBS | DIASTOLIC BLOOD PRESSURE: 88 MMHG

## 2022-03-21 DIAGNOSIS — K64.4 RESIDUAL HEMORRHOID TAGS: ICD-10-CM

## 2022-03-21 DIAGNOSIS — K64.8 INTERNAL HEMORRHOID, BLEEDING: ICD-10-CM

## 2022-03-21 PROCEDURE — 99214 OFFICE O/P EST MOD 30 MIN: CPT | Performed by: INTERNAL MEDICINE

## 2022-03-21 RX ORDER — HYDROCORTISONE ACETATE 25 MG/1
25 SUPPOSITORY RECTAL 2 TIMES DAILY
Qty: 12 SUPPOSITORY | Refills: 0 | Status: SHIPPED | OUTPATIENT
Start: 2022-03-21

## 2022-03-21 NOTE — PATIENT INSTRUCTIONS
High Fiber Diet   WHAT YOU NEED TO KNOW:   A high-fiber diet includes foods that have a high amount of fiber  Fiber is the part of fruits, vegetables, and grains that is not broken down by your body  Fiber keeps your bowel movements regular  Fiber can also help lower your cholesterol level, control blood sugar in people with diabetes, and relieve constipation  Fiber can also help you control your weight because it helps you feel full faster  Most adults should eat 25 to 35 grams of fiber each day  Talk to your dietitian or healthcare provider about the amount of fiber you need  DISCHARGE INSTRUCTIONS:   Good sources of fiber:       · Foods with at least 4 grams of fiber per serving:      ? ? to ½ cup of high-fiber cereal (check the nutrition label on the box)    ? ½ cup of blackberries or raspberries    ? 4 dried prunes    ? 1 cooked artichoke    ? ½ cup of cooked legumes, such as lentils, or red, kidney, and villanueva beans    · Foods with 1 to 3 grams of fiber per serving:      ? 1 slice of whole-wheat, pumpernickel, or rye bread    ? ½ cup of cooked brown rice    ? 4 whole-wheat crackers    ? 1 cup of oatmeal    ? ½ cup of cereal with 1 to 3 grams of fiber per serving (check the nutrition label on the box)    ? 1 small piece of fruit, such as an apple, banana, pear, kiwi, or orange    ? 3 dates    ? ½ cup of canned apricots, fruit cocktail, peaches, or pears    ? ½ cup of raw or cooked vegetables, such as carrots, cauliflower, cabbage, spinach, squash, or corn    Ways that you can increase fiber in your diet:   · Choose brown or wild rice instead of white rice  · Use whole wheat flour in recipes instead of white or all-purpose flour  · Add beans and peas to casseroles or soups  · Choose fresh fruit and vegetables with peels or skins on instead of juices  Other diet guidelines to follow:   · Add fiber to your diet slowly    You may have abdominal discomfort, bloating, and gas if you add fiber to your diet too quickly  · Drink plenty of liquids as you add fiber to your diet  You may have nausea or develop constipation if you do not drink enough water  Ask how much liquid to drink each day and which liquids are best for you  © Copyright [employer] 2022 Information is for End User's use only and may not be sold, redistributed or otherwise used for commercial purposes  All illustrations and images included in CareNotes® are the copyrighted property of [employer]  or Rebekah Herr   The above information is an  only  It is not intended as medical advice for individual conditions or treatments  Talk to your doctor, nurse or pharmacist before following any medical regimen to see if it is safe and effective for you

## 2022-03-21 NOTE — PROGRESS NOTES
[facility] - Outpatient Follow-up Note  Cary Fernandez 57 y o  male MRN: [id number]  Encounter: [id number]          ASSESSMENT AND PLAN:      1  Internal hemorrhoid, bleeding  Patient had two episodes of bright red bleeding per rectum on the wipes  That has resolved  This happened about six weeks ago  Currently he does not have any pain  He denies any constipation or diarrhea  Denies any change in bowel habits  I have reviewed the reports of his previous colonoscopy that was done in July 2018  Patient had hyperplastic polyp  Patient is recommended to have high-fiber diet  I will prescribe Anusol hydrocortisone suppositories  He will return in 6-8 weeks  Hemorrhoidal banding will be done at that time  - Ambulatory referral to Gastroenterology    2  Residual hemorrhoid tags  Residual skin tag noted during rectal examination  There was no other abnormality  Skin tag cannot be operated by me  Advised patient to see a colorectal surgeon if he wants had resected  However patient tells me that he does not have any problem with the tag itself  Bleeding is what made him concerned  - Ambulatory referral to Gastroenterology    ______________________________________________________________________    SUBJECTIVE:  Bright red bleeding per rectum  No rectal pain or discomfort  No recent change in bowel habits or constipation  Denies any diarrhea  There is no rectal pain or itching  REVIEW OF SYSTEMS IS OTHERWISE NEGATIVE  Historical Information   History reviewed  No pertinent past medical history    Past Surgical History:   Procedure Laterality Date    COLONOSCOPY      HERNIA REPAIR  1998     Social History   Social History     Substance and Sexual Activity   Alcohol Use Yes    Comment: Daily     Social History     Substance and Sexual Activity   Drug Use No     Social History     Tobacco Use   Smoking Status Never Smoker   Smokeless Tobacco Never Used Family History   Problem Relation Age of Onset    Hypertension Mother     Hyperlipidemia Father        Meds/Allergies       Current Outpatient Medications:     zolpidem (AMBIEN) 5 mg tablet    No Known Allergies        Objective     Blood pressure 145/88, pulse 67, height 5' 8" (1 727 m), weight 72 6 kg (160 lb)  Body mass index is 24 33 kg/m²  PHYSICAL EXAM:      General Appearance:   Alert, cooperative, no distress   HEENT:   Normocephalic, atraumatic, anicteric      Neck:  Supple, symmetrical, trachea midline   Lungs:   Clear to auscultation bilaterally; no rales, rhonchi or wheezing; respirations unlabored    Heart[de-identified]   Regular rate and rhythm; no murmur, rub, or gallop  Abdomen:   Soft, non-tender, non-distended; normal bowel sounds; no masses, no organomegaly    Genitalia:   Deferred   Anal skin tag noted in the right anterior part  Rectal:   Deferred    Extremities:  No cyanosis, clubbing or edema    Pulses:  2+ and symmetric    Skin:  No jaundice, rashes, or lesions    Lymph nodes:  No palpable cervical lymphadenopathy        Lab Results:   No visits with results within 1 Day(s) from this visit  Latest known visit with results is:   Orders Only on 02/10/2022   Component Date Value    Glucose, Random 02/10/2022 108*    BUN 02/10/2022 11     Creatinine 02/10/2022 0 85     eGFR Non  02/10/2022 93     eGFR  02/10/2022 108     SL AMB BUN/CREATININE RA* 02/10/2022 13     Sodium 02/10/2022 139     Potassium 02/10/2022 4 3     Chloride 02/10/2022 100     CO2 02/10/2022 25     CALCIUM 02/10/2022 9 5     Hemoglobin A1C 02/10/2022 6 0*         Radiology Results:   XR shoulder 2+ vw right    Result Date: 3/11/2022  Narrative: RIGHT SHOULDER INDICATION:   M25 511: Pain in right shoulder  COMPARISON:  None VIEWS:  XR SHOULDER 2+ VW RIGHT Images: 3 FINDINGS: There is no acute fracture or dislocation  Mild osteoarthritis acromioclavicular joint   No lytic or blastic osseous lesion  Soft tissues are unremarkable  Impression: No acute osseous abnormality  Workstation performed: [id number]     US inguinal area    Result Date: 3/14/2022  Narrative: ULTRASOUND INGUINAL AREA INDICATION:   K40 90: Unilateral inguinal hernia, without obstruction or gangrene, not specified as recurrent  COMPARISON:  None TECHNIQUE:   Real-time ultrasound of the left groin soft tissues was performed with a linear transducer with both volumetric sweeps and still imaging techniques  FINDINGS:  Extensive shadowing is seen in the left groin soft tissues, likely sequela of prior hernia repair  Questionable 1 1 cm fat-containing groin hernia adjacent to the repair site  No peristalsing bowel loops are noted in the groin  Impression: Extensive shadowing in the left groin soft tissues limiting evaluation, likely sequela of prior hernia repair  Questionable 1 1 cm fat-containing left groin hernia adjacent to the repair site  No peristalsing bowel loops noted in the groin   Workstation performed: [id number]

## 2022-03-24 ENCOUNTER — OFFICE VISIT (OUTPATIENT)
Dept: SURGERY | Facility: CLINIC | Age: 62
End: 2022-03-24
Payer: COMMERCIAL

## 2022-03-24 VITALS
HEIGHT: 68 IN | HEART RATE: 60 BPM | DIASTOLIC BLOOD PRESSURE: 80 MMHG | SYSTOLIC BLOOD PRESSURE: 136 MMHG | TEMPERATURE: 97.3 F | OXYGEN SATURATION: 93 % | WEIGHT: 164.2 LBS | BODY MASS INDEX: 24.89 KG/M2

## 2022-03-24 DIAGNOSIS — K40.90 LEFT INGUINAL HERNIA: Primary | ICD-10-CM

## 2022-03-24 PROCEDURE — 3008F BODY MASS INDEX DOCD: CPT | Performed by: SPECIALIST

## 2022-03-24 PROCEDURE — 99213 OFFICE O/P EST LOW 20 MIN: CPT | Performed by: SPECIALIST

## 2022-03-24 PROCEDURE — 1036F TOBACCO NON-USER: CPT | Performed by: SPECIALIST

## 2022-03-24 NOTE — PROGRESS NOTES
General Surgery Office Visit Follow up   [facility]  Patient: Manny Jacobs   : 1960 Sex: male MRN: [id number]   CSN: [id number] PCP: Jaguar Arrieta DO    Assessment/ Plan:  Manny Jacobs is a 57 y o  male  day(s) follow-up after ultrasound for left groin possible recurrent hernia  Left inguinal hernia [K40 90]    Plan  Questionable 1 1 cm fat containing hernia recurrent with mesh artifect on ultrasound    Patient is completely asymptomatic  And wants to wait and watch as he has no symptoms and it is a questionable possible small hernia    SUBJECTIVE:   I am doing very well I have no pain symptoms no issue was just the on my physical examination my doctor send me to your placed possible evaluation    OBJECTIVE:  No complaints  No fever no chills no rigors  Tolerating p o   Diet well  Normal bowel movement no constipation or diarrhea  Ambulating well     Vitals:   /80 (BP Location: Right arm, Patient Position: Sitting, Cuff Size: Large)   Pulse 60   Temp (!) 97 3 °F (36 3 °C) (Core)   Ht 5' 8" (1 727 m)   Wt 74 5 kg (164 lb 3 2 oz)   SpO2 93%   BMI 24 97 kg/m²     Active medications:    Current Outpatient Medications:     hydrocortisone (ANUSOL-HC) 25 mg suppository, Insert 1 suppository (25 mg total) into the rectum 2 (two) times a day, Disp: 12 suppository, Rfl: 0    zolpidem (AMBIEN) 5 mg tablet, Take 1 tablet (5 mg total) by mouth daily at bedtime as needed for sleep, Disp: 30 tablet, Rfl: 1    Physical Exam:   General Alert awake   Normocephalic atraumatic PERRLA   Lungs clear bilaterally  Cardiac normal S1 normal S2  Abdomen soft,non tender Bowel sounds present history of previous hernia surgery with scar bilateral minimal cough impulse with no pain no tenderness  Skin:  Moist  Ext: No clubbing, cyanosis, edema    Visit Diagnosis:   Diagnoses and all orders for this visit:    Left inguinal hernia       Plan of care was discussed with patient in detail    Pertinent labs reviewed  Most Recent Labs:   No visits with results within 2 Week(s) from this visit  Latest known visit with results is:   Orders Only on 02/10/2022   Component Date Value Ref Range Status    Glucose, Random 02/10/2022 108* 65 - 99 mg/dL Final    BUN 02/10/2022 11  8 - 27 mg/dL Final    Creatinine 02/10/2022 0 85  0 76 - 1 27 mg/dL Final    eGFR Non  02/10/2022 93  >59 mL/min/1 73 Final    eGFR  02/10/2022 108  >59 mL/min/1 73 Final    Comment: **In accordance with recommendations from the NKF-ASN Task force,**    [facility] is in the process of updating its eGFR calculation to the    2021 CKD-EPI creatinine equation that estimates kidney function    without a race variable   SL AMB BUN/CREATININE RATIO 02/10/2022 13  10 - 24 Final    Sodium 02/10/2022 139  134 - 144 mmol/L Final    Potassium 02/10/2022 4 3  3 5 - 5 2 mmol/L Final    Chloride 02/10/2022 100  96 - 106 mmol/L Final    CO2 02/10/2022 25  20 - 29 mmol/L Final    CALCIUM 02/10/2022 9 5  8 6 - 10 2 mg/dL Final    Hemoglobin A1C 02/10/2022 6 0* 4 8 - 5 6 % Final    Comment:          Prediabetes: 5 7 - 6 4           Diabetes: >6 4           Glycemic control for adults with diabetes: <7 0       Pertinent images and available reads personally reviewed  Procedure: XR shoulder 2+ vw right    Result Date: 3/11/2022  Narrative: RIGHT SHOULDER INDICATION:   M25 511: Pain in right shoulder  COMPARISON:  None VIEWS:  XR SHOULDER 2+ VW RIGHT Images: 3 FINDINGS: There is no acute fracture or dislocation  Mild osteoarthritis acromioclavicular joint  No lytic or blastic osseous lesion  Soft tissues are unremarkable  Impression: No acute osseous abnormality  Workstation performed: [id number]     Procedure: US inguinal area    Result Date: 3/14/2022  Narrative: ULTRASOUND INGUINAL AREA INDICATION:   K40 90: Unilateral inguinal hernia, without obstruction or gangrene, not specified as recurrent  COMPARISON:  None TECHNIQUE:   Real-time ultrasound of the left groin soft tissues was performed with a linear transducer with both volumetric sweeps and still imaging techniques  FINDINGS:  Extensive shadowing is seen in the left groin soft tissues, likely sequela of prior hernia repair  Questionable 1 1 cm fat-containing groin hernia adjacent to the repair site  No peristalsing bowel loops are noted in the groin  Impression: Extensive shadowing in the left groin soft tissues limiting evaluation, likely sequela of prior hernia repair  Questionable 1 1 cm fat-containing left groin hernia adjacent to the repair site  No peristalsing bowel loops noted in the groin  Workstation performed: [id number]         Pertinent notes reviewed    Counseling / Coordination of Care  Total Office time /unit time spent today 15minutes  Greater than 50% of total time was spent with the patient and / or family counseling and / or coordination of care  A description of the counseling / coordination of care:  I performed an interim history, pertinent images and labs, performed a physical examination to arrive at the plan delineated above with associated thought processes  Family member/primary contact updated     Melva Lopes MD MS [address]  03/24/22 3:57 PM        Portions of the record may have been created with voice recognition software  Occasional wrong word or "sound a like" substitutions may have occurred due to the inherent limitations of voice recognition software  Read the chart carefully and recognize, using context, where substitutions have occurred

## 2022-03-29 ENCOUNTER — EVALUATION (OUTPATIENT)
Dept: PHYSICAL THERAPY | Facility: CLINIC | Age: 62
End: 2022-03-29
Payer: COMMERCIAL

## 2022-03-29 DIAGNOSIS — M25.511 ACUTE PAIN OF RIGHT SHOULDER: Primary | ICD-10-CM

## 2022-03-29 PROCEDURE — 97161 PT EVAL LOW COMPLEX 20 MIN: CPT | Performed by: PHYSICAL THERAPIST

## 2022-03-29 PROCEDURE — 97110 THERAPEUTIC EXERCISES: CPT | Performed by: PHYSICAL THERAPIST

## 2022-03-29 NOTE — PROGRESS NOTES
PT Evaluation     Today's date: 3/29/2022  Patient name: Madelyn Mcdonald  : 1960  MRN: [id number]  Referring provider: Hank Ceballos*  Dx:   Encounter Diagnosis     ICD-10-CM    1  Acute pain of right shoulder  M25 [id number]                   Assessment  Assessment details: Madelyn Mcdonald is a 57 y o  male who presents with pain and decreased strength  Due to these impairments, patient has difficulty performing ADL's, reaching  Patient's clinical presentation is consistent with their referring diagnosis of Acute pain of right shoulder  (primary encounter diagnosis)    Patient has been educated in home exercise program and plan of care  Patient will follow up next week to determine HEP appropriateness   Patient would benefit from skilled physical therapy services to address their aforementioned functional limitations and progress towards prior level of function and independence with home exercise program      Impairments: abnormal or restricted ROM, activity intolerance, impaired physical strength, lacks appropriate home exercise program, pain with function, poor posture  and poor body mechanics  Understanding of Dx/Px/POC: good   Prognosis: good    Goals  Short Term Goals to be accomplished in 3 weeks:  STG1: Pt will be I with HEP  STG2: Pt will be I with posture management   STG3: Pt will demo 50% improvement in shoulder AROM to improve self care and household ADLs   STG4: Pt will demo 1/2 MMT strength in shoulder  STG5: Pt will report pain < 2/10 in shoulder     Long Term Goals to be accomplished in 6 weeks:   LTG1: Pt will demo full shoulder AROM to return to household duties  Javier Causey: Pt will return to work/household ADLs pain free as per PLOF  LTG3: Pt will demo shoulder strength [address] details:  HEP development, stretching, strengthening, A/AA/PROM, joint mobilizations, posture education, STM/MI as needed to reduce muscle tension, muscle reeducation, PLOC discussed and agreed upon with patient  Patient would benefit from: PT eval and skilled physical therapy  Planned modality interventions: cryotherapy and thermotherapy: hydrocollator packs  Planned therapy interventions: manual therapy, neuromuscular re-education, self care, therapeutic exercise, therapeutic activities and home exercise program  Frequency: 2x week (2-3x/week)  Duration in weeks: 6  Treatment plan discussed with: patient        Subjective Evaluation    History of Present Illness  Mechanism of injury: Pt reports mid January-  he was being awoken in the night due to shoulder pain lateral and distal deltoid insertion  No causative event  Pt denies neck pain or history of neck pain  Pt reports it has not been bothering him in 3-4 weeks  He was dx with bursitis  Pt feels his R shoulder is at 99% of his L side function         Quality of life: good    Pain  Current pain ratin  At best pain ratin  At worst pain rating: 3          Objective     Cervical/Thoracic Screen   Cervical range of motion within normal limits    Neurological Testing     Sensation     Shoulder   Left Shoulder   Intact: light touch    Right Shoulder   Intact: light touch    Additional Neurological Details  ULTT (-)    Active Range of Motion   Left Shoulder   Normal active range of motion    Right Shoulder   Normal active range of motion  Extension: with pain    Passive Range of Motion   Left Shoulder   Normal passive range of motion    Right Shoulder   Normal passive range of motion    Strength/Myotome Testing     Left Shoulder   Normal muscle strength    Right Shoulder   Normal muscle strength    Additional Strength Details  Pain with resisted extension R shldr             Precautions: Standard      Visit 1             3/29/22                                                   Neuro Re-Ed                                                                                                        Ther Ex             Iso Ext  10x            Shldr ext TB Blue 2x10            Shldr ext [employer] 10x                                                                             Ther Activity                                                                              Modalities

## 2022-04-26 ENCOUNTER — OFFICE VISIT (OUTPATIENT)
Dept: OBGYN CLINIC | Facility: CLINIC | Age: 62
End: 2022-04-26
Payer: COMMERCIAL

## 2022-04-26 VITALS
HEIGHT: 68 IN | WEIGHT: 164 LBS | HEART RATE: 62 BPM | DIASTOLIC BLOOD PRESSURE: 87 MMHG | BODY MASS INDEX: 24.86 KG/M2 | SYSTOLIC BLOOD PRESSURE: 150 MMHG

## 2022-04-26 DIAGNOSIS — M75.81 ROTATOR CUFF TENDINITIS, RIGHT: Primary | ICD-10-CM

## 2022-04-26 PROCEDURE — 1036F TOBACCO NON-USER: CPT | Performed by: ORTHOPAEDIC SURGERY

## 2022-04-26 PROCEDURE — 99213 OFFICE O/P EST LOW 20 MIN: CPT | Performed by: ORTHOPAEDIC SURGERY

## 2022-04-26 PROCEDURE — 3008F BODY MASS INDEX DOCD: CPT | Performed by: ORTHOPAEDIC SURGERY

## 2022-04-26 NOTE — PROGRESS NOTES
Assessment/Plan:  1  Rotator cuff tendinitis, right       The patient is doing well will continue his home exercises  He has excellent strength and range of motion on examination today  No further imaging is warranted at this point  He can increase his activities as tolerated  He will follow up as needed  Subjective:   Davida Nieto is a 57 y o  male who presents today for follow-up of his right shoulder  We have been treating him conservatively for suspected rotator cuff tendinitis  Did attend physical therapy and was taught some exercises which she has been doing at home  He notes that these have significantly helped  He notes minimal pain about the shoulder at this point  He notes good range of motion and improving strength  He notes good sensation of the right upper extremity  Review of Systems   Constitutional: Negative  Negative for chills and fever  HENT: Negative  Negative for ear pain and sore throat  Eyes: Negative  Negative for pain and redness  Respiratory: Negative  Negative for shortness of breath and wheezing  Cardiovascular: Negative for chest pain and palpitations  Gastrointestinal: Negative  Negative for abdominal pain and blood in stool  Endocrine: Negative  Negative for polydipsia and polyuria  Genitourinary: Negative  Negative for difficulty urinating and dysuria  Musculoskeletal:        As noted in HPI   Skin: Negative  Negative for pallor and rash  Neurological: Negative  Negative for dizziness and numbness  Hematological: Negative  Negative for adenopathy  Does not bruise/bleed easily  Psychiatric/Behavioral: Negative  Negative for confusion and suicidal ideas  No past medical history on file      Past Surgical History:   Procedure Laterality Date    COLONOSCOPY      HERNIA REPAIR  1998       Family History   Problem Relation Age of Onset    Hypertension Mother     Hyperlipidemia Father        Social History     Occupational History    Not on file   Tobacco Use    Smoking status: Never Smoker    Smokeless tobacco: Never Used   Vaping Use    Vaping Use: Never used   Substance and Sexual Activity    Alcohol use: Yes     Comment: Daily    Drug use: No    Sexual activity: Not on file         Current Outpatient Medications:     hydrocortisone (ANUSOL-HC) 25 mg suppository, Insert 1 suppository (25 mg total) into the rectum 2 (two) times a day, Disp: 12 suppository, Rfl: 0    zolpidem (AMBIEN) 5 mg tablet, Take 1 tablet (5 mg total) by mouth daily at bedtime as needed for sleep, Disp: 30 tablet, Rfl: 1    No Known Allergies    Objective:  Vitals:    04/26/22 0757   BP: 150/87   Pulse: 62       Right Shoulder Exam     Tenderness   The patient is experiencing no tenderness  Range of Motion   Active abduction: 170   External rotation: 70   Forward flexion: 180   Internal rotation 0 degrees: T10     Muscle Strength   Abduction: 5/5   Internal rotation: 5/5   External rotation: 5/5     Tests   Impingement: positive  Drop arm: negative    Other   Erythema: absent  Sensation: normal  Pulse: present            Physical Exam  Constitutional:       General: He is not in acute distress  Appearance: He is well-developed  HENT:      Head: Normocephalic and atraumatic  Eyes:      General: No scleral icterus  Conjunctiva/sclera: Conjunctivae normal    Neck:      Vascular: No JVD  Cardiovascular:      Rate and Rhythm: Normal rate  Pulmonary:      Effort: Pulmonary effort is normal  No respiratory distress  Skin:     General: Skin is warm  Neurological:      Mental Status: He is alert and oriented to person, place, and time        Coordination: Coordination normal

## 2022-09-25 DIAGNOSIS — G47.00 PERSISTENT INSOMNIA: ICD-10-CM

## 2022-10-05 RX ORDER — ZOLPIDEM TARTRATE 5 MG/1
TABLET ORAL
Qty: 20 TABLET | Refills: 0 | Status: SHIPPED | OUTPATIENT
Start: 2022-10-05

## 2023-08-28 ENCOUNTER — OFFICE VISIT (OUTPATIENT)
Dept: URGENT CARE | Facility: CLINIC | Age: 63
End: 2023-08-28
Payer: COMMERCIAL

## 2023-08-28 ENCOUNTER — TELEPHONE (OUTPATIENT)
Dept: GASTROENTEROLOGY | Facility: CLINIC | Age: 63
End: 2023-08-28

## 2023-08-28 ENCOUNTER — APPOINTMENT (OUTPATIENT)
Dept: RADIOLOGY | Facility: CLINIC | Age: 63
End: 2023-08-28
Payer: COMMERCIAL

## 2023-08-28 VITALS
WEIGHT: 153 LBS | RESPIRATION RATE: 12 BRPM | BODY MASS INDEX: 23.26 KG/M2 | TEMPERATURE: 101.9 F | HEART RATE: 80 BPM | OXYGEN SATURATION: 97 %

## 2023-08-28 DIAGNOSIS — R50.9 FEVER, UNSPECIFIED: Primary | ICD-10-CM

## 2023-08-28 DIAGNOSIS — R50.9 FEVER, UNSPECIFIED: ICD-10-CM

## 2023-08-28 LAB
SARS-COV-2 AG UPPER RESP QL IA: NEGATIVE
VALID CONTROL: NORMAL

## 2023-08-28 PROCEDURE — 87811 SARS-COV-2 COVID19 W/OPTIC: CPT | Performed by: PHYSICIAN ASSISTANT

## 2023-08-28 PROCEDURE — 71046 X-RAY EXAM CHEST 2 VIEWS: CPT

## 2023-08-28 PROCEDURE — 99203 OFFICE O/P NEW LOW 30 MIN: CPT | Performed by: PHYSICIAN ASSISTANT

## 2023-08-28 NOTE — PROGRESS NOTES
[facility]        NAME: Tiburcio Mcelroy is a 60 y.o. male  : 1960    MRN: [id number]  DATE: 2023  TIME: 7:15 PM    Assessment and Plan   Fever, unspecified [R50.9]  1. Fever, unspecified  Poct Covid 19 Rapid Antigen Test    XR chest pa & lateral        Offered tylenol but pt declined. Negative covid poct. Will do cxr to r/o PNA in setting of fever and productive cough. cxr clear per my preliminary read, pending official radiology report. Suspect viral URI and recommend supportive care and otc meds for symptomatic relief. Discussed importance of maintaining adequate hydration. Discussed strict return to care precautions as well as red flag symptoms which should prompt immediate ED referral. Pt verbalized understanding and is in agreement with plan. Please follow up with your primary care provider within the next week. Please remember that your visit today was with an urgent care provider and should not replace follow up with your primary care provider for chronic medical issues or annual physicals. Patient Instructions       Follow up with PCP in 3-5 days. Proceed to  ER if symptoms worsen. Chief Complaint     Chief Complaint   Patient presents with   • Cold Like Symptoms     Pt states  he started with cough, watery eyes, PND,  Covid test negative x 2 at home; History of Present Illness       Tiburcio Mcelroy is a(n) 60 y.o. male presenting with URI symptoms x 3 days  Past medical history: none  Congestion: yes  Sore throat: yes  Cough: yes  Sputum production: yes, yesterday but not today  Fever: yes, 101.9F here  Body aches: yes  Loss of smell/taste: no  GI symptoms: no  Known sick contacts: no  OTC meds tried: mucinex once last night        Review of Systems   Review of Systems   Constitutional: Positive for chills and fever. Negative for diaphoresis and fatigue. HENT: Positive for congestion and sore throat.  Negative for ear pain, postnasal drip, rhinorrhea, sinus pain, sneezing and trouble swallowing. Eyes: Negative for pain and redness. Respiratory: Positive for cough. Negative for chest tightness, shortness of breath and wheezing. Cardiovascular: Negative for chest pain and leg swelling. Gastrointestinal: Negative for diarrhea, nausea and vomiting. Musculoskeletal: Positive for myalgias. Neurological: Negative for dizziness, weakness and headaches. Current Medications       Current Outpatient Medications:   •  hydrocortisone (ANUSOL-HC) 25 mg suppository, Insert 1 suppository (25 mg total) into the rectum 2 (two) times a day (Patient not taking: Reported on 8/28/2023), Disp: 12 suppository, Rfl: 0  •  zolpidem (AMBIEN) 5 mg tablet, TAKE ONE TABLET BY MOUTH AT BEDTIME AS NEEDED FOR SLEEP (Patient not taking: Reported on 8/28/2023), Disp: 20 tablet, Rfl: 0    Current Allergies     Allergies as of 08/28/2023   • (No Known Allergies)            The following portions of the patient's history were reviewed and updated as appropriate: allergies, current medications, past family history, past medical history, past social history, past surgical history and problem list.     History reviewed. No pertinent past medical history. Past Surgical History:   Procedure Laterality Date   • COLONOSCOPY     • HERNIA REPAIR  1998       Family History   Problem Relation Age of Onset   • Hypertension Mother    • Hyperlipidemia Father          Medications have been verified. Objective   Pulse 80   Temp (!) 101.9 °F (38.8 °C)   Resp 12   Wt 69.4 kg (153 lb)   SpO2 97%   BMI 23.26 kg/m²        Physical Exam     Physical Exam  Vitals and nursing note reviewed. Constitutional:       General: He is not in acute distress. Appearance: Normal appearance. He is not toxic-appearing. HENT:      Head: Normocephalic and atraumatic.       Right Ear: Tympanic membrane, ear canal and external ear normal.      Left Ear: Tympanic membrane, ear canal and external ear normal.      Nose: No congestion. Mouth/Throat:      Mouth: Mucous membranes are moist.      Pharynx: Oropharynx is clear. No oropharyngeal exudate or posterior oropharyngeal erythema. Eyes:      Conjunctiva/sclera: Conjunctivae normal.      Pupils: Pupils are equal, round, and reactive to light. Cardiovascular:      Rate and Rhythm: Normal rate and regular rhythm. Heart sounds: Normal heart sounds. Pulmonary:      Effort: Pulmonary effort is normal. No respiratory distress. Breath sounds: Normal breath sounds. No wheezing, rhonchi or rales. Musculoskeletal:      Cervical back: Normal range of motion and neck supple. Skin:     General: Skin is warm and dry. Capillary Refill: Capillary refill takes less than 2 seconds. Neurological:      Mental Status: He is alert and oriented to person, place, and time.    Psychiatric:         Behavior: Behavior normal.

## 2023-09-05 ENCOUNTER — OFFICE VISIT (OUTPATIENT)
Dept: FAMILY MEDICINE CLINIC | Facility: CLINIC | Age: 63
End: 2023-09-05
Payer: COMMERCIAL

## 2023-09-05 VITALS
TEMPERATURE: 98.1 F | SYSTOLIC BLOOD PRESSURE: 122 MMHG | DIASTOLIC BLOOD PRESSURE: 80 MMHG | WEIGHT: 150 LBS | HEART RATE: 69 BPM | HEIGHT: 68 IN | RESPIRATION RATE: 14 BRPM | BODY MASS INDEX: 22.73 KG/M2

## 2023-09-05 DIAGNOSIS — J01.00 ACUTE NON-RECURRENT MAXILLARY SINUSITIS: Primary | ICD-10-CM

## 2023-09-05 PROBLEM — N40.1 BPH WITH URINARY OBSTRUCTION: Status: ACTIVE | Noted: 2022-07-14

## 2023-09-05 PROBLEM — N13.8 BPH WITH URINARY OBSTRUCTION: Status: ACTIVE | Noted: 2022-07-14

## 2023-09-05 PROCEDURE — 99213 OFFICE O/P EST LOW 20 MIN: CPT | Performed by: FAMILY MEDICINE

## 2023-09-05 RX ORDER — AMOXICILLIN AND CLAVULANATE POTASSIUM 875; 125 MG/1; MG/1
1 TABLET, FILM COATED ORAL 2 TIMES DAILY
Qty: 20 TABLET | Refills: 0 | Status: SHIPPED | OUTPATIENT
Start: 2023-09-05 | End: 2023-09-15

## 2023-09-05 NOTE — PROGRESS NOTES
Assessment/Plan:    No problem-specific Assessment & Plan notes found for this encounter. Sinobronchitis new  mucinex DM suggested if not sedating  F/u if no better     Diagnoses and all orders for this visit:    Acute non-recurrent maxillary sinusitis  -     amoxicillin-clavulanate (AUGMENTIN) 875-125 mg per tablet; Take 1 tablet by mouth 2 (two) times a day for 10 days      Return if symptoms worsen or fail to improve. Subjective:      Patient ID: Ramiro Mcmullen is a 60 y.o. male. Chief Complaint   Patient presents with   • Fever     Comes and goes for the past 12 days lw cma   • Cough     Cough runny nose cant sleep covid test at home and urgent care was negative lw cma       HPI  covid neg  Sick for 12d  Fever  Cough  Runny nose  Fever recurred after some brief improvement  Mucus is green, phlegm and also from nose  cxr neg  Used robitussin DM and some nyquill more recently    The following portions of the patient's history were reviewed and updated as appropriate: allergies, current medications, past family history, past medical history, past social history, past surgical history and problem list.    Review of Systems   Constitutional: Positive for fever. Respiratory: Negative for shortness of breath. Current Outpatient Medications   Medication Sig Dispense Refill   • amoxicillin-clavulanate (AUGMENTIN) 875-125 mg per tablet Take 1 tablet by mouth 2 (two) times a day for 10 days 20 tablet 0     No current facility-administered medications for this visit. Objective:    /80   Pulse 69   Temp 98.1 °F (36.7 °C) (Tympanic)   Resp 14   Ht 5' 8" (1.727 m)   Wt 68 kg (150 lb)   BMI 22.81 kg/m²        Physical Exam  Vitals and nursing note reviewed. Constitutional:       General: He is not in acute distress. Appearance: He is well-developed. He is not ill-appearing. HENT:      Head: Normocephalic.       Right Ear: Tympanic membrane normal.      Left Ear: Tympanic membrane normal.   Eyes:      General: No scleral icterus. Conjunctiva/sclera: Conjunctivae normal.   Cardiovascular:      Rate and Rhythm: Normal rate and regular rhythm. Heart sounds: No murmur heard. Pulmonary:      Effort: Pulmonary effort is normal. No respiratory distress. Breath sounds: No wheezing. Comments: Coarse midline cough present. Abdominal:      Palpations: Abdomen is soft. Musculoskeletal:         General: No deformity. Cervical back: Neck supple. Right lower leg: No edema. Left lower leg: No edema. Skin:     General: Skin is warm and dry. Coloration: Skin is not pale. Neurological:      Mental Status: He is alert. Motor: No weakness. Gait: Gait normal.   Psychiatric:         Behavior: Behavior normal.         Thought Content:  Thought content normal.                Charisse Hunter, DO

## 2023-09-05 NOTE — PATIENT INSTRUCTIONS
Over-the-counter products can be useful for your symptoms:                      <<GUAIFENESIN>> is an expectorant that is useful for thick mucus. Often found in Robitussin and Mucinex products. <<DEXTROMETHORPHAN>> is a cough suppressant that works in the brain   to help reduce bothersome cough. Use with caution with other medications that work in the brain. <<ANTI-HISTAMINES>> are useful as allergy medications and to help dry up   bothersome thin secretions. Less sedating options include   Claritin, Zyrtec, Allegra and Xyzal and have generic OTC forms. <<PSEUDOEPHEDRINE and PHENYLEPHRINE>> are stimulant decongestants   that can be used for congestion and sinus pressure. Avoid or use with caution with high blood pressure or heart conditions. <<NASAL SPRAYS>> Steroid nasal sprays (flonase, nasacort) are used for allergies but   can be used for congestion also. Safe for high blood pressure. Afrin is a decongestant that works quickly but for up to 3 days. Today I would suggest mucinex DM pills, twice a day as an expectorant and cough suppressant and you may also add some nyquill at bedtime if you want.

## 2023-10-25 ENCOUNTER — PREP FOR PROCEDURE (OUTPATIENT)
Dept: GASTROENTEROLOGY | Facility: CLINIC | Age: 63
End: 2023-10-25

## 2023-10-25 DIAGNOSIS — Z86.010 HISTORY OF COLON POLYPS: Primary | ICD-10-CM

## 2023-11-02 ENCOUNTER — TELEPHONE (OUTPATIENT)
Dept: GASTROENTEROLOGY | Facility: CLINIC | Age: 63
End: 2023-11-02

## 2023-11-02 NOTE — TELEPHONE ENCOUNTER
Spoke with patient and he is going to check with wife and give us a call back to schedule colonoscopy. Gave patient number to call back to schedule.   Order created

## 2023-11-03 ENCOUNTER — TELEPHONE (OUTPATIENT)
Age: 63
End: 2023-11-03

## 2023-11-03 NOTE — TELEPHONE ENCOUNTER
11/03/23  Screened by: Truong Ling    Referring Provider Y      Pre- Screening: There is no height or weight on file to calculate BMI. Has patient been referred for a routine screening Colonoscopy? yes  Is the patient between 43-73 years old? yes      Previous Colonoscopy yes   If yes: 5 years    Date:     Facility:     Reason:       SCHEDULING STAFF: If the patient is between 39yrs-51yrs, please advise patient to confirm benefits/coverage with their insurance company for a routine screening colonoscopy, some insurance carriers will only cover at [address] [place] [address] or older. If the patient is over 66years old, please schedule an office visit. Does the patient want to see a Gastroenterologist prior to their procedure OR are they having any GI symptoms? no    Has the patient been hospitalized or had abdominal surgery in the past 6 months? no    Does the patient use supplemental oxygen? no    Does the patient take Coumadin, Lovenox, Plavix, Elliquis, Xarelto, or other blood thinning medication? no    Has the patient had a stroke, cardiac event, or stent placed in the past year? no  PT PASSED OA    SCHEDULING STAFF: If patient answers NO to above questions, then schedule procedure. If patient answers YES to above questions, then schedule office appointment. If patient is between 45yrs - 49yrs, please advise patient that we will have to confirm benefits & coverage with their insurance company for a routine screening colonoscopy.

## 2023-11-03 NOTE — TELEPHONE ENCOUNTER
Scheduled date of colonoscopy (as of today):11/28/2023  Physician performing colonoscopy:Dr Carlee Duffy  Location of colonoscopy:[facility]  Bowel prep reviewed with patient:Miralx/Dulcolax  Instructions reviewed with patient by:  Clearances: N/A    ASC Screening    ASC Screening  BMI > than 45: No  Are you currently pregnant?: No  Do you rely on a wheelchair for mobility?: No  Do you need oxygen during the day?: No  Have you ever been informed by anesthesia that you have a difficult airway?: No  Have you been diagnosed with End Stage Renal Disease (ESRD)?: No  Are you actively on dialysis?: No  Have you been diagnosed with Pulmonary Hypertension?: No  Do you have a pacemaker or an Automatic Implantable Cardioverter Defibrillator (AICD)?: No  Have you ever had an organ transplant?: No  Have you had a stroke, heart attack, myocardial infarction (MI) within the last 6 months?: No  Have you ever been diagnosed with Aortic Stenosis?: No  Have you ever been diagnosed  with Congestive Heart Failure?: No  Have you ever been diagnosed with a heart valve disease?: No  Are you Diabetic?: No  If you are Diabetic, has your A1C been greater than 12 within the last six months?: N/A

## 2023-11-04 PROBLEM — J01.00 ACUTE NON-RECURRENT MAXILLARY SINUSITIS: Status: RESOLVED | Noted: 2023-09-05 | Resolved: 2023-11-04

## 2023-11-14 ENCOUNTER — ANESTHESIA (OUTPATIENT)
Dept: ANESTHESIOLOGY | Facility: AMBULATORY SURGERY CENTER | Age: 63
End: 2023-11-14

## 2023-11-14 ENCOUNTER — ANESTHESIA EVENT (OUTPATIENT)
Dept: ANESTHESIOLOGY | Facility: AMBULATORY SURGERY CENTER | Age: 63
End: 2023-11-14

## 2023-11-28 ENCOUNTER — ANESTHESIA (OUTPATIENT)
Dept: GASTROENTEROLOGY | Facility: AMBULATORY SURGERY CENTER | Age: 63
End: 2023-11-28

## 2023-11-28 ENCOUNTER — HOSPITAL ENCOUNTER (OUTPATIENT)
Dept: GASTROENTEROLOGY | Facility: AMBULATORY SURGERY CENTER | Age: 63
Discharge: HOME | End: 2023-11-28
Payer: COMMERCIAL

## 2023-11-28 ENCOUNTER — ANESTHESIA EVENT (OUTPATIENT)
Dept: GASTROENTEROLOGY | Facility: AMBULATORY SURGERY CENTER | Age: 63
End: 2023-11-28

## 2023-11-28 VITALS
OXYGEN SATURATION: 100 % | DIASTOLIC BLOOD PRESSURE: 81 MMHG | SYSTOLIC BLOOD PRESSURE: 166 MMHG | TEMPERATURE: 97.6 F | RESPIRATION RATE: 18 BRPM | HEART RATE: 48 BPM

## 2023-11-28 DIAGNOSIS — Z86.010 HISTORY OF COLON POLYPS: ICD-10-CM

## 2023-11-28 PROCEDURE — G0105 COLORECTAL SCRN; HI RISK IND: HCPCS | Performed by: INTERNAL MEDICINE

## 2023-11-28 RX ORDER — PROPOFOL 10 MG/ML
INJECTION, EMULSION INTRAVENOUS AS NEEDED
Status: DISCONTINUED | OUTPATIENT
Start: 2023-11-28 | End: 2023-11-28

## 2023-11-28 RX ORDER — LIDOCAINE HYDROCHLORIDE 20 MG/ML
INJECTION, SOLUTION EPIDURAL; INFILTRATION; INTRACAUDAL; PERINEURAL AS NEEDED
Status: DISCONTINUED | OUTPATIENT
Start: 2023-11-28 | End: 2023-11-28

## 2023-11-28 RX ORDER — SODIUM CHLORIDE, SODIUM LACTATE, POTASSIUM CHLORIDE, CALCIUM CHLORIDE 600; 310; 30; 20 MG/100ML; MG/100ML; MG/100ML; MG/100ML
INJECTION, SOLUTION INTRAVENOUS CONTINUOUS PRN
Status: DISCONTINUED | OUTPATIENT
Start: 2023-11-28 | End: 2023-11-28

## 2023-11-28 RX ADMIN — PROPOFOL 100 MG: 10 INJECTION, EMULSION INTRAVENOUS at 07:34

## 2023-11-28 RX ADMIN — SODIUM CHLORIDE, SODIUM LACTATE, POTASSIUM CHLORIDE, CALCIUM CHLORIDE: 600; 310; 30; 20 INJECTION, SOLUTION INTRAVENOUS at 07:34

## 2023-11-28 RX ADMIN — PROPOFOL 50 MG: 10 INJECTION, EMULSION INTRAVENOUS at 07:37

## 2023-11-28 RX ADMIN — LIDOCAINE HYDROCHLORIDE 100 MG: 20 INJECTION, SOLUTION EPIDURAL; INFILTRATION; INTRACAUDAL; PERINEURAL at 07:34

## 2023-11-28 RX ADMIN — PROPOFOL 50 MG: 10 INJECTION, EMULSION INTRAVENOUS at 07:49

## 2023-11-28 RX ADMIN — PROPOFOL 50 MG: 10 INJECTION, EMULSION INTRAVENOUS at 07:40

## 2023-11-28 RX ADMIN — PROPOFOL 50 MG: 10 INJECTION, EMULSION INTRAVENOUS at 07:44

## 2023-11-28 NOTE — ANESTHESIA POSTPROCEDURE EVALUATION
Post-Op Assessment Note    CV Status:  Stable  Pain Score: 0    Pain management: adequate       Mental Status:  Alert and awake   Hydration Status:  Euvolemic   PONV Controlled:  Controlled   Airway Patency:  Patent     Post Op Vitals Reviewed: Yes    No anethesia notable event occurred.     Staff: Anesthesiologist, CRNA               BP  113/67   Temp      Pulse 52   Resp   16   SpO2   98

## 2023-11-28 NOTE — DISCHARGE SUMMARY
Discharge Summary - Brynn Coley 58 y.o. male MRN: [id number]    Unit/Bed#:  Encounter: [id number]    Admission Date: 11/28/2023    Admitting Diagnosis: History of colon polyps [Z86.010]    HPI: History of colon polyp. History of hemorrhoids    Procedures Performed: No orders of the defined types were placed in this encounter. Summary of Hospital Course: Tolerated procedure well    Significant Findings, Care, Treatment and Services Provided: Internal hemorrhoid noted and mild diverticulosis detected. Recurrent polyp not seen. Complications: None    Discharge Diagnosis: See above    Medical Problems       Resolved Problems  Date Reviewed: 11/14/2023   None         Condition at Discharge: Good         Discharge instructions/Information to patient and family:   See after visit summary for information provided to patient and family. Provisions for Follow-Up Care:  See after visit summary for information related to follow-up care and any pertinent home health orders.       PCP: Viry Sheldon DO    Disposition: Home

## 2023-11-28 NOTE — INTERVAL H&P NOTE
H&P reviewed. After examining the patient I find no changes in the patients condition since the H&P had been written.     Vitals:    11/28/23 0703   BP: (!) 176/89   Pulse: (!) 53   Resp: 18   Temp: 97.6 °F (36.4 °C)   SpO2: 99%

## 2023-11-28 NOTE — H&P
History and Physical - [facility]  Katy Quintero 58 y.o. male MRN: [id number]                  HPI: Katy Quintero is a 58y.o. year old male who presents for screening colonoscopy. History of colon polyp. REVIEW OF SYSTEMS: Per the HPI, and otherwise unremarkable. Historical Information   Past Medical History:   Diagnosis Date    Colon polyp      Past Surgical History:   Procedure Laterality Date    COLONOSCOPY      HERNIA REPAIR  1998     Social History   Social History     Substance and Sexual Activity   Alcohol Use Yes    Alcohol/week: 5.0 standard drinks of alcohol    Types: 5 Standard drinks or equivalent per week     Social History     Substance and Sexual Activity   Drug Use No     Social History     Tobacco Use   Smoking Status Never    Passive exposure: Never   Smokeless Tobacco Never     Family History   Problem Relation Age of Onset    Hypertension Mother     Hyperlipidemia Father        Meds/Allergies     No current outpatient medications on file. No Known Allergies    Objective     BP (!) 176/89   Pulse (!) 53   Temp 97.6 °F (36.4 °C)   Resp 18   SpO2 99%       PHYSICAL EXAM    Gen: NAD  Head: NCAT  CV: RRR  CHEST: Clear  ABD: soft, NT/ND  EXT: no edema      ASSESSMENT/PLAN:  This is a 58y.o. year old male here for colonoscopy, and he is stable and optimized for his procedure.

## 2023-11-28 NOTE — ANESTHESIA PREPROCEDURE EVALUATION
Procedure:  COLONOSCOPY    Relevant Problems   CARDIO   (+) Internal hemorrhoid, bleeding      GI/HEPATIC   (+) Internal hemorrhoid, bleeding      /RENAL   (+) BPH with urinary obstruction        Physical Exam    Airway    Mallampati score: II  TM Distance: >3 FB  Neck ROM: full     Dental   Comment: None loose, No notable dental hx     Cardiovascular      Pulmonary      Other Findings        Anesthesia Plan  ASA Score- 2     Anesthesia Type- IV sedation with anesthesia with ASA Monitors. Additional Monitors:     Airway Plan:     Comment: 01:15 - last of Po bowel prep    Patient educated on the possibility for awareness under sedation and of the possibility of airway intervention in the event of an airway or procedural emergency  . Plan Factors-Exercise tolerance (METS): >4 METS. Chart reviewed. Patient summary reviewed. Patient is not a current smoker. Induction- intravenous. Postoperative Plan-     Informed Consent- Anesthetic plan and risks discussed with patient. I personally reviewed this patient with the CRNA. Discussed and agreed on the Anesthesia Plan with the CRNA. Angel Boswell

## 2023-11-29 ENCOUNTER — TELEPHONE (OUTPATIENT)
Age: 63
End: 2023-11-29

## 2023-11-29 NOTE — TELEPHONE ENCOUNTER
Patient has his annual physical exam scheduled for 12/26 and he would like to know if Dr Blayne Lowery can put lab work into his chart so that he can get this done before his appointment. Patient will be going to [facility] so he is requesting lab order be emailed to him.

## 2023-12-02 DIAGNOSIS — Z13.1 SCREENING FOR DIABETES MELLITUS (DM): ICD-10-CM

## 2023-12-02 DIAGNOSIS — R73.03 PREDIABETES: Primary | ICD-10-CM

## 2023-12-02 DIAGNOSIS — Z13.89 SCREENING FOR CARDIOVASCULAR, RESPIRATORY, AND GENITOURINARY DISEASES: ICD-10-CM

## 2023-12-02 DIAGNOSIS — Z13.83 SCREENING FOR CARDIOVASCULAR, RESPIRATORY, AND GENITOURINARY DISEASES: ICD-10-CM

## 2023-12-02 DIAGNOSIS — Z13.6 SCREENING FOR CARDIOVASCULAR, RESPIRATORY, AND GENITOURINARY DISEASES: ICD-10-CM

## 2023-12-15 LAB
ALBUMIN SERPL-MCNC: 4.5 G/DL (ref 3.9–4.9)
ALBUMIN/GLOB SERPL: 2.3 {RATIO} (ref 1.2–2.2)
ALP SERPL-CCNC: 78 IU/L (ref 44–121)
ALT SERPL-CCNC: 21 IU/L (ref 0–44)
AST SERPL-CCNC: 23 IU/L (ref 0–40)
BILIRUB SERPL-MCNC: 0.3 MG/DL (ref 0–1.2)
BUN SERPL-MCNC: 15 MG/DL (ref 8–27)
BUN/CREAT SERPL: 17 (ref 10–24)
CALCIUM SERPL-MCNC: 9.5 MG/DL (ref 8.6–10.2)
CHLORIDE SERPL-SCNC: 103 MMOL/L (ref 96–106)
CHOLEST SERPL-MCNC: 223 MG/DL (ref 100–199)
CO2 SERPL-SCNC: 25 MMOL/L (ref 20–29)
CREAT SERPL-MCNC: 0.9 MG/DL (ref 0.76–1.27)
EGFR: 95 ML/MIN/1.73
GLOBULIN SER-MCNC: 2 G/DL (ref 1.5–4.5)
GLUCOSE SERPL-MCNC: 96 MG/DL (ref 70–99)
HBA1C MFR BLD: 5.9 % (ref 4.8–5.6)
HDLC SERPL-MCNC: 56 MG/DL
LDLC SERPL CALC-MCNC: 153 MG/DL (ref 0–99)
MICRODELETION SYND BLD/T FISH: NORMAL
POTASSIUM SERPL-SCNC: 4.3 MMOL/L (ref 3.5–5.2)
PROT SERPL-MCNC: 6.5 G/DL (ref 6–8.5)
SODIUM SERPL-SCNC: 141 MMOL/L (ref 134–144)
TRIGL SERPL-MCNC: 78 MG/DL (ref 0–149)

## 2023-12-25 PROBLEM — M79.18 BRACHIORADIALIS MUSCLE TENDERNESS: Status: RESOLVED | Noted: 2019-09-10 | Resolved: 2023-12-25

## 2023-12-25 PROBLEM — M79.601 PAIN IN RIGHT ARM: Status: RESOLVED | Noted: 2020-06-05 | Resolved: 2023-12-25

## 2023-12-25 RX ORDER — AMOXICILLIN AND CLAVULANATE POTASSIUM 875; 125 MG/1; MG/1
TABLET, FILM COATED ORAL
COMMUNITY
Start: 2023-09-05 | End: 2023-12-26

## 2023-12-26 ENCOUNTER — OFFICE VISIT (OUTPATIENT)
Dept: FAMILY MEDICINE CLINIC | Facility: CLINIC | Age: 63
End: 2023-12-26
Payer: COMMERCIAL

## 2023-12-26 VITALS
BODY MASS INDEX: 23.95 KG/M2 | TEMPERATURE: 96.9 F | OXYGEN SATURATION: 99 % | HEART RATE: 58 BPM | SYSTOLIC BLOOD PRESSURE: 170 MMHG | WEIGHT: 158 LBS | RESPIRATION RATE: 16 BRPM | DIASTOLIC BLOOD PRESSURE: 78 MMHG | HEIGHT: 68 IN

## 2023-12-26 DIAGNOSIS — R97.20 ELEVATED PSA: ICD-10-CM

## 2023-12-26 DIAGNOSIS — Z91.89 FRAMINGHAM CARDIAC RISK 10-20% IN NEXT 10 YEARS: ICD-10-CM

## 2023-12-26 DIAGNOSIS — Z00.00 HEALTHCARE MAINTENANCE: Primary | ICD-10-CM

## 2023-12-26 DIAGNOSIS — R03.0 ELEVATED BP WITHOUT DIAGNOSIS OF HYPERTENSION: ICD-10-CM

## 2023-12-26 DIAGNOSIS — Z13.6 ENCOUNTER FOR SCREENING FOR CARDIOVASCULAR DISORDERS: ICD-10-CM

## 2023-12-26 PROCEDURE — 99396 PREV VISIT EST AGE 40-64: CPT | Performed by: FAMILY MEDICINE

## 2023-12-26 NOTE — PROGRESS NOTES
"Assessment/Plan:    No problem-specific Assessment & Plan notes found for this encounter.    Cpe  Porter score 19%, declines statins  Elevated bp, TLC advised, f/u 1m or sooner for dx  Elevated PSA, f/u urology  Possible left scrotal varicocele, US pending    No cp or sob but wants to see a cardiologist for screening     Diagnoses and all orders for this visit:    Healthcare maintenance    Elevated BP without diagnosis of hypertension    Porter cardiac risk 10-20% in next 10 years    Encounter for screening for cardiovascular disorders  -     Ambulatory referral to Cardiology; Future    Elevated PSA    Other orders  -     Discontinue: amoxicillin-clavulanate (AUGMENTIN) 875-125 mg per tablet;  (Patient not taking: Reported on 12/26/2023)        Return in about 1 month (around 1/26/2024) for Recheck blood pressure.    Subjective:      Patient ID: Rock Kline is a 63 y.o. male.    Chief Complaint   Patient presents with    Physical Exam     Ligia Yousif MA        HPI  Sees urologist in [facility]  Prostate bx normal x 2 neg  Psa remains elevated at 6  Labs reviewed  Healthy diet  No formal program    The following portions of the patient's history were reviewed and updated as appropriate: allergies, current medications, past family history, past medical history, past social history, past surgical history and problem list.    Review of Systems   Constitutional:  Negative for chills and fever.   Respiratory:  Negative for shortness of breath.    Cardiovascular:  Negative for chest pain.         No current outpatient medications on file.     No current facility-administered medications for this visit.       Objective:    /78   Pulse 58   Temp (!) 96.9 °F (36.1 °C)   Resp 16   Ht 5' 8\" (1.727 m)   Wt 71.7 kg (158 lb)   SpO2 99%   BMI 24.02 kg/m²        Physical Exam  Vitals and nursing note reviewed.   Constitutional:       General: He is not in acute distress.     Appearance: He is " well-developed. He is not ill-appearing.   HENT:      Head: Normocephalic.      Right Ear: Tympanic membrane and ear canal normal.      Left Ear: Tympanic membrane and ear canal normal.   Eyes:      General: No scleral icterus.     Conjunctiva/sclera: Conjunctivae normal.   Cardiovascular:      Rate and Rhythm: Normal rate and regular rhythm.      Heart sounds: No murmur heard.  Pulmonary:      Effort: Pulmonary effort is normal. No respiratory distress.      Breath sounds: No wheezing.   Abdominal:      General: There is no distension.      Palpations: Abdomen is soft.      Hernia: No hernia is present.      Comments: Left varcicocele   Musculoskeletal:         General: No deformity.      Cervical back: Neck supple.      Right lower leg: No edema.      Left lower leg: No edema.   Skin:     General: Skin is warm and dry.      Coloration: Skin is not pale.   Neurological:      Mental Status: He is alert.      Motor: No weakness.      Gait: Gait normal.   Psychiatric:         Mood and Affect: Mood normal.         Behavior: Behavior normal.         Thought Content: Thought content normal.                Livan Goldstein DO

## 2024-01-30 ENCOUNTER — OFFICE VISIT (OUTPATIENT)
Dept: FAMILY MEDICINE CLINIC | Facility: CLINIC | Age: 64
End: 2024-01-30
Payer: COMMERCIAL

## 2024-01-30 VITALS
SYSTOLIC BLOOD PRESSURE: 142 MMHG | OXYGEN SATURATION: 98 % | WEIGHT: 155 LBS | DIASTOLIC BLOOD PRESSURE: 72 MMHG | BODY MASS INDEX: 23.49 KG/M2 | TEMPERATURE: 97.2 F | HEART RATE: 60 BPM | RESPIRATION RATE: 16 BRPM | HEIGHT: 68 IN

## 2024-01-30 DIAGNOSIS — R73.03 PREDIABETES: ICD-10-CM

## 2024-01-30 DIAGNOSIS — R03.0 ELEVATED BP WITHOUT DIAGNOSIS OF HYPERTENSION: Primary | ICD-10-CM

## 2024-01-30 PROCEDURE — 99213 OFFICE O/P EST LOW 20 MIN: CPT | Performed by: FAMILY MEDICINE

## 2024-01-30 NOTE — PROGRESS NOTES
"Assessment/Plan:    No problem-specific Assessment & Plan notes found for this encounter.    Bp has improved but aware borderline  Sbp was 170 in past  Continue same efforts  F/u prn     Diagnoses and all orders for this visit:    Elevated BP without diagnosis of hypertension    Prediabetes              Return if symptoms worsen or fail to improve.    Subjective:      Patient ID: Rock Kline is a 63 y.o. male.    Chief Complaint   Patient presents with    Follow-up     Ligia Yousif MA     Hypertension       HPI  Last sbp 170 last month  Made changes  Stopped red wine, 1 glass/d  Less salt in cooking  Reading labels for salt  More exercise, now 3-4x/w, was 1-2x/w before  3# loss    The following portions of the patient's history were reviewed and updated as appropriate: allergies, current medications, past family history, past medical history, past social history, past surgical history and problem list.    Review of Systems   Constitutional:  Negative for chills and fever.   Respiratory:  Negative for chest tightness.          No current outpatient medications on file.     No current facility-administered medications for this visit.       Objective:    /72   Pulse 60   Temp (!) 97.2 °F (36.2 °C)   Resp 16   Ht 5' 8\" (1.727 m)   Wt 70.3 kg (155 lb)   SpO2 98%   BMI 23.57 kg/m²        Physical Exam  Vitals and nursing note reviewed.   Constitutional:       General: He is not in acute distress.     Appearance: He is well-developed. He is not ill-appearing.   HENT:      Head: Normocephalic.      Right Ear: Tympanic membrane and ear canal normal.      Left Ear: Tympanic membrane and ear canal normal.   Eyes:      Conjunctiva/sclera: Conjunctivae normal.   Neck:      Vascular: No carotid bruit.   Cardiovascular:      Rate and Rhythm: Normal rate and regular rhythm.   Pulmonary:      Effort: Pulmonary effort is normal. No respiratory distress.      Breath sounds: No wheezing.   Abdominal:      " General: There is no distension.      Palpations: Abdomen is soft.      Tenderness: There is no abdominal tenderness.   Musculoskeletal:         General: No deformity.      Cervical back: Neck supple.   Skin:     General: Skin is warm and dry.      Coloration: Skin is not jaundiced or pale.   Neurological:      Mental Status: He is alert.      Motor: No weakness.      Gait: Gait normal.   Psychiatric:         Behavior: Behavior normal.         Thought Content: Thought content normal.                Livan Goldstein,

## 2024-01-30 NOTE — PATIENT INSTRUCTIONS
Continue healthy lifestyle changes to get to an resting BP goal of <140/90 ideally but medication would not be absolutely necessary unless the top number is greater or equal to 150 (age).  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  An initial medication option may be amlodipine 5mg once a day.  If we start the medication over the phone, we would like to check you in the office 2-3 weeks later.

## 2024-02-21 PROBLEM — Z13.83 SCREENING FOR CARDIOVASCULAR, RESPIRATORY, AND GENITOURINARY DISEASES: Status: RESOLVED | Noted: 2018-02-21 | Resolved: 2024-02-21

## 2024-02-21 PROBLEM — Z13.89 SCREENING FOR CARDIOVASCULAR, RESPIRATORY, AND GENITOURINARY DISEASES: Status: RESOLVED | Noted: 2018-02-21 | Resolved: 2024-02-21

## 2024-02-21 PROBLEM — Z00.00 HEALTHCARE MAINTENANCE: Status: RESOLVED | Noted: 2018-02-21 | Resolved: 2024-02-21

## 2024-02-21 PROBLEM — Z13.1 SCREENING FOR DIABETES MELLITUS (DM): Status: RESOLVED | Noted: 2018-02-21 | Resolved: 2024-02-21

## 2024-02-21 PROBLEM — Z13.6 SCREENING FOR CARDIOVASCULAR, RESPIRATORY, AND GENITOURINARY DISEASES: Status: RESOLVED | Noted: 2018-02-21 | Resolved: 2024-02-21

## 2024-03-06 ENCOUNTER — CONSULT (OUTPATIENT)
Dept: CARDIOLOGY CLINIC | Facility: CLINIC | Age: 64
End: 2024-03-06
Payer: COMMERCIAL

## 2024-03-06 VITALS
WEIGHT: 159 LBS | SYSTOLIC BLOOD PRESSURE: 132 MMHG | HEIGHT: 68 IN | DIASTOLIC BLOOD PRESSURE: 100 MMHG | BODY MASS INDEX: 24.1 KG/M2 | HEART RATE: 57 BPM | OXYGEN SATURATION: 98 %

## 2024-03-06 DIAGNOSIS — R03.0 ELEVATED BP WITHOUT DIAGNOSIS OF HYPERTENSION: ICD-10-CM

## 2024-03-06 DIAGNOSIS — E78.00 PURE HYPERCHOLESTEROLEMIA: ICD-10-CM

## 2024-03-06 DIAGNOSIS — Z13.6 ENCOUNTER FOR SCREENING FOR CARDIOVASCULAR DISORDERS: Primary | ICD-10-CM

## 2024-03-06 PROCEDURE — 93000 ELECTROCARDIOGRAM COMPLETE: CPT | Performed by: INTERNAL MEDICINE

## 2024-03-06 PROCEDURE — 99203 OFFICE O/P NEW LOW 30 MIN: CPT | Performed by: INTERNAL MEDICINE

## 2024-03-06 NOTE — PROGRESS NOTES
[facility]  [address] [place] [address] [address]   [place] [address]    Cardiology Consultation    Rock Kline  [id number]  1960      Consult for: Cardiac risk evaluation  Appreciate consult by: Livan Goldstein DO      Discussion/Summary:     1. Encounter for screening for cardiovascular disorders  POCT ECG    Ambulatory referral to Cardiology    CT coronary calcium score      2. Elevated BP without diagnosis of hypertension  POCT ECG    CT coronary calcium score      3. Pure hypercholesterolemia           Discussed risk factor reduction including refraining from smoking, eating a diet high in fruits and vegetables, maintaining a healthy weight, limiting screen time along with controlling BP and cholesterol.  Encouraged to exercise 150 minutes a week at a moderate level such as a fast walk or 75 minutes of high intensity.    Reviewed lipid and blood pressure guidelines.  Blood pressure is elevated today and during visit with Dr. Jones.  Cholesterol levels have also been elevated over the years.  He is hesitant to begin on medication therapy.  Will obtain calcium scoring for further risk stratification.  If score is above 0, he will consider therapy at that time.  May use combination tablet of amlodipine/atorvastatin if needed.    HPI:     Rock Kline is a 63 y.o. here for evaluation of cardiac risk.  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  He is concerned about his cardiac risk as he has a history of elevated blood pressure and cholesterol levels.  He is not on any medications at this time.  During his most recent visits with Dr. Jones, he was noted to have elevated blood pressure.  He has purchased a blood pressure cuff for home but has not used it yet.  He denies any chest pain or shortness of breath with exertion.  He hiked a large amount over the past year and did not have any difficulties with exertion.  Does not exercise  "regularly and has a long commute.  Diet is overall good.  He eats a large amount of vegetables and fruit.  He also eats walnuts daily.  Most recent lipid panel in December 2023 showed total cholesterol 223, HDL of 56 and LDL calculated at 153.  A1c was 5.9%.  He has had elevated lipid levels over the past few years as well.    Past Medical History:   Diagnosis Date    Colon polyp      Social History     Tobacco Use    Smoking status: Never     Passive exposure: Never    Smokeless tobacco: Never   Vaping Use    Vaping status: Never Used   Substance Use Topics    Alcohol use: Yes     Alcohol/week: 5.0 standard drinks of alcohol     Types: 5 Standard drinks or equivalent per week    Drug use: No      Family History   Problem Relation Age of Onset    Hypertension Mother     Hyperlipidemia Father      Past Surgical History:   Procedure Laterality Date    COLONOSCOPY      HERNIA REPAIR  1998     No current outpatient medications on file.  No Known Allergies    Review of Systems:   Review of Systems   Respiratory:  Negative for shortness of breath.    Cardiovascular:  Negative for chest pain, palpitations and leg swelling.   All other systems reviewed and are negative.      Physical Examination:     Vitals:    03/06/24 0833   BP: 132/100   BP Location: Right arm   Patient Position: Sitting   Cuff Size: Standard   Pulse: 57   SpO2: 98%   Weight: 72.1 kg (159 lb)   Height: 5' 8\" (1.727 m)       Physical Exam   Constitutional: He appears healthy. No distress.   Eyes: Pupils are equal, round, and reactive to light. Conjunctivae are normal.   Neck: No JVD present.   Cardiovascular: Normal rate, regular rhythm and normal heart sounds. Exam reveals no gallop and no friction rub.   No murmur heard.  Pulmonary/Chest: Effort normal and breath sounds normal. He has no wheezes. He has no rales.   Musculoskeletal:         General: No tenderness, deformity or edema.      Cervical back: Normal range of motion and neck supple. " "  Neurological: He is alert and oriented to person, place, and time.   Skin: Skin is warm and dry.        Labs:     Lab Results   Component Value Date    WBC 4.7 02/09/2017    HGB 14.5 02/09/2017    HCT 44.7 02/09/2017    MCV 87 02/09/2017    RDW 14.1 02/09/2017     02/09/2017     BMP:  Lab Results   Component Value Date    SODIUM 141 12/14/2023    K 4.3 12/14/2023     12/14/2023    CO2 25 12/14/2023    BUN 15 12/14/2023    CREATININE 0.90 12/14/2023    GLUC 96 12/14/2023    CALCIUM 9.5 02/09/2017    EGFR 95 12/14/2023     LFT:  Lab Results   Component Value Date    AST 23 12/14/2023    ALT 21 12/14/2023    ALKPHOS 78 02/09/2017    TP 6.5 12/14/2023    ALB 4.5 12/14/2023      Lab Results   Component Value Date    TSX3NHBFUAZB 1.130 02/16/2016     Lab Results   Component Value Date    HGBA1C 5.9 (H) 12/14/2023     Lipid Profile:   Lab Results   Component Value Date    CHOLESTEROL 223 (H) 12/14/2023    HDL 56 12/14/2023    LDLCALC 153 (H) 12/14/2023    TRIG 78 12/14/2023     Lab Results   Component Value Date    CHOLESTEROL 223 (H) 12/14/2023    CHOLESTEROL 209 (H) 07/01/2021     No results found for: \"CKTOTAL\", \"CKMB\", \"CKMBINDEX\", \"TROPONINI\"  No results found for: \"NTBNP\"   No results found for this or any previous visit (from the past 672 hour(s)).    Imaging & Testing   I have personally reviewed pertinent reports.      Cardiac Testing   No results found for this or any previous visit.    EKG: Personally reviewed.    Normal sinus rhythm with flattened T waves laterally      Kinza Ramires DO, [facility]  [phone]  Please call with any questions.  "

## 2024-03-13 ENCOUNTER — HOSPITAL ENCOUNTER (OUTPATIENT)
Dept: CT IMAGING | Facility: HOSPITAL | Age: 64
Discharge: HOME | End: 2024-03-13
Payer: COMMERCIAL

## 2024-03-13 DIAGNOSIS — Z13.6 ENCOUNTER FOR SCREENING FOR CARDIOVASCULAR DISORDERS: ICD-10-CM

## 2024-03-13 DIAGNOSIS — R03.0 ELEVATED BP WITHOUT DIAGNOSIS OF HYPERTENSION: ICD-10-CM

## 2024-03-13 PROCEDURE — 75571 CT HRT W/O DYE W/CA TEST: CPT

## 2024-03-21 ENCOUNTER — TELEPHONE (OUTPATIENT)
Dept: CARDIOLOGY CLINIC | Facility: CLINIC | Age: 64
End: 2024-03-21

## 2024-03-25 DIAGNOSIS — R03.0 ELEVATED BP WITHOUT DIAGNOSIS OF HYPERTENSION: ICD-10-CM

## 2024-03-25 DIAGNOSIS — E78.00 PURE HYPERCHOLESTEROLEMIA: Primary | ICD-10-CM

## 2024-03-25 RX ORDER — AMLODIPINE BESYLATE AND ATORVASTATIN CALCIUM 5; 10 MG/1; MG/1
1 TABLET, FILM COATED ORAL DAILY
Qty: 90 TABLET | Refills: 3 | Status: SHIPPED | OUTPATIENT
Start: 2024-03-25

## 2024-05-13 ENCOUNTER — TELEPHONE (OUTPATIENT)
Age: 64
End: 2024-05-13

## 2024-05-13 NOTE — TELEPHONE ENCOUNTER
Patient is calling looking for an apt with a provider for after 5.  I did stated the providers are all booked and he might need to go to [facility]. Stated to Rock that I would leave a message for Dr. Maynard since he wanted tomorrow and Dr. Goldstein is on tomorrow night.  Please call patient back if overbook is possible.  Thank you

## 2024-05-13 NOTE — TELEPHONE ENCOUNTER
Spoke to patient says it is not urgent and he would wait to schedule because he would have to take the whole day out of work

## 2024-05-20 DIAGNOSIS — E78.00 PURE HYPERCHOLESTEROLEMIA: ICD-10-CM

## 2024-05-20 DIAGNOSIS — R03.0 ELEVATED BP WITHOUT DIAGNOSIS OF HYPERTENSION: ICD-10-CM

## 2024-05-20 NOTE — TELEPHONE ENCOUNTER
Reason for call:   [x] Refill   [] Prior Auth  [] Other:     Office:   [x] PCP/Provider -   [] Specialty/Provider -     Medication: AMLODIPINE ATORVASTATIN    Dose/Frequency: 5-10 MG    Quantity: 90    Pharmacy:   [facility]    Does the patient have enough for 3 days?   [] Yes   [] No - Send as HP to POD

## 2024-05-21 RX ORDER — AMLODIPINE BESYLATE AND ATORVASTATIN CALCIUM 5; 10 MG/1; MG/1
1 TABLET, FILM COATED ORAL DAILY
Qty: 90 TABLET | Refills: 1 | Status: SHIPPED | OUTPATIENT
Start: 2024-05-21

## 2024-05-22 ENCOUNTER — TELEPHONE (OUTPATIENT)
Dept: CARDIOLOGY CLINIC | Facility: CLINIC | Age: 64
End: 2024-05-22

## 2024-05-22 NOTE — TELEPHONE ENCOUNTER
Rec'd call from pharmacy stating that they received prescription from Dr. Ramires with [place] demographic information.    Physician is not licensed in [place].    Verbally provided her with [place] address.    (She states that there have been several issues like this this week with [place] providers and [place] addresses. She states that she did notify someone to look into the E-script.)

## 2024-06-05 ENCOUNTER — TELEPHONE (OUTPATIENT)
Dept: CARDIOLOGY CLINIC | Facility: CLINIC | Age: 64
End: 2024-06-05

## 2024-06-05 LAB
ALBUMIN SERPL-MCNC: 4.2 G/DL (ref 3.9–4.9)
ALBUMIN/GLOB SERPL: 1.7 {RATIO} (ref 1.2–2.2)
ALP SERPL-CCNC: 84 IU/L (ref 44–121)
ALT SERPL-CCNC: 24 IU/L (ref 0–44)
AST SERPL-CCNC: 32 IU/L (ref 0–40)
BILIRUB SERPL-MCNC: 0.8 MG/DL (ref 0–1.2)
BUN SERPL-MCNC: 11 MG/DL (ref 8–27)
BUN/CREAT SERPL: 13 (ref 10–24)
CALCIUM SERPL-MCNC: 9.4 MG/DL (ref 8.6–10.2)
CHLORIDE SERPL-SCNC: 102 MMOL/L (ref 96–106)
CHOLEST SERPL-MCNC: 144 MG/DL (ref 100–199)
CO2 SERPL-SCNC: 23 MMOL/L (ref 20–29)
CREAT SERPL-MCNC: 0.88 MG/DL (ref 0.76–1.27)
EGFR: 96 ML/MIN/1.73
GLOBULIN SER-MCNC: 2.5 G/DL (ref 1.5–4.5)
GLUCOSE SERPL-MCNC: 125 MG/DL (ref 70–99)
HDLC SERPL-MCNC: 55 MG/DL
LDLC SERPL CALC-MCNC: 71 MG/DL (ref 0–99)
POTASSIUM SERPL-SCNC: 4.6 MMOL/L (ref 3.5–5.2)
PROT SERPL-MCNC: 6.7 G/DL (ref 6–8.5)
SL AMB VLDL CHOLESTEROL CALC: 18 MG/DL (ref 5–40)
SODIUM SERPL-SCNC: 139 MMOL/L (ref 134–144)
TRIGL SERPL-MCNC: 94 MG/DL (ref 0–149)

## 2024-06-05 NOTE — TELEPHONE ENCOUNTER
----- Message from Kinza Raimres DO sent at 6/5/2024  3:27 PM EDT -----  Can you please let the patient know cholesterol levels much better even with a small dose of medication.  Hope he has made some lifestyle changes as well

## 2024-06-12 ENCOUNTER — OFFICE VISIT (OUTPATIENT)
Dept: CARDIOLOGY CLINIC | Facility: CLINIC | Age: 64
End: 2024-06-12
Payer: COMMERCIAL

## 2024-06-12 VITALS
WEIGHT: 155 LBS | HEIGHT: 68 IN | BODY MASS INDEX: 23.49 KG/M2 | HEART RATE: 53 BPM | DIASTOLIC BLOOD PRESSURE: 88 MMHG | SYSTOLIC BLOOD PRESSURE: 148 MMHG | OXYGEN SATURATION: 99 %

## 2024-06-12 DIAGNOSIS — E78.2 MIXED HYPERLIPIDEMIA: ICD-10-CM

## 2024-06-12 DIAGNOSIS — R73.03 PREDIABETES: ICD-10-CM

## 2024-06-12 DIAGNOSIS — Z91.89 FRAMINGHAM CARDIAC RISK 10-20% IN NEXT 10 YEARS: ICD-10-CM

## 2024-06-12 DIAGNOSIS — I10 PRIMARY HYPERTENSION: Primary | ICD-10-CM

## 2024-06-12 PROCEDURE — 99214 OFFICE O/P EST MOD 30 MIN: CPT | Performed by: INTERNAL MEDICINE

## 2024-06-12 NOTE — PROGRESS NOTES
Cardiology   Kinza Ramires DO, [facility]  Anjel Kerns MD, [facility]  Cecil Fuentes MD, [facility]  Maynor Choi MD, [facility]  -------------------------------------------------------------------  [facility]  [address], [address]  [place], [place], [address]  [phone]    Cardiology Follow Up  Rock Kline  1960  [id number]          Assessment/Plan:    1. Primary hypertension  - BP was elevated on initial assessment but is significantly improved after waiting in office.  Will continue amlodipine 5 mg daily    2. Mixed hyperlipidemia  -Significantly improved with addition of atorvastatin 10 mg daily.  -Discussed diet.  He overall eats a good diet.    3. Prediabetes  -A1c has been elevated over the past few years.  Has not reached diabetes range.  Was educated on potential risk of elevated sugar with statin use.  Should continue to use this medication however.    4. [place] cardiac risk 10-20% in next 10 years  Discussed risk factor reduction including refraining from smoking, eating a diet high in fruits and vegetables, maintaining a healthy weight, limiting screen time along with controlling BP and cholesterol.  Encouraged to exercise 150 minutes a week at a moderate level such as a fast walk or 75 minutes of high intensity.             Interval History:     Rock Kline is 63 y.o. male here for followup of hyperlipidemia.    He was initially seen on March 6, 2024 for evaluation of cardiac risk.  He has a history of hypertension and hyperlipidemia.  He had no symptoms at the time.  Lipid levels in December 2023 showed a total cholesterol 223, HDL of 56 and LDL of 153.  A1c was 5.9%.  CT coronary calcium score was ordered at patient was hesitant to begin medical therapy.  Calcium score was elevated at 892 which is 92nd percentile for age and race and gender matched cohort.  Afterwards, he was started on a combination tablet of amlodipine 5 mg and  "atorvastatin 10 mg daily.  Repeat blood work was done which showed an LDL of 71 mg/dL, total cholesterol 114 and HDL of 55.  Blood pressure is elevated today but repeat check was better.    Since his last visit, he has been feeling well.  he denies any palpitations, chest pain, shortness of breath, LE edema, orthopnea or PND.   Diet is overall unchanged.  There has not been a significant change in weight.         The following portions of the patient's history were reviewed and updated as appropriate: allergies, current medications, past family history, past medical history, past social history, past surgical history, and problem list.       Current Outpatient Medications:     amLODIPine-atorvastatin (CADUET) 5-10 MG per tablet, Take 1 tablet by mouth daily, Disp: 90 tablet, Rfl: 1        Review of Systems:  Review of Systems   Respiratory:  Negative for shortness of breath.    Cardiovascular:  Negative for chest pain, palpitations and leg swelling.   All other systems reviewed and are negative.        Physical Exam:  Vitals:  Vitals:    06/12/24 0811   BP: 148/88   BP Location: Right arm   Patient Position: Sitting   Cuff Size: Standard   Pulse: (!) 53   SpO2: 99%   Weight: 70.3 kg (155 lb)   Height: 5' 8\" (1.727 m)     Physical Exam   Constitutional: He appears healthy. No distress.   Eyes: Pupils are equal, round, and reactive to light. Conjunctivae are normal.   Neck: No JVD present.   Cardiovascular: Normal rate, regular rhythm and normal heart sounds. Exam reveals no gallop and no friction rub.   No murmur heard.  Pulmonary/Chest: Effort normal and breath sounds normal. He has no wheezes. He has no rales.   Musculoskeletal:         General: No tenderness, deformity or edema.      Cervical back: Normal range of motion and neck supple.   Neurological: He is alert and oriented to person, place, and time.   Skin: Skin is warm and dry.        Cardiographics:  EKG: Personally reviewed NSR  See above    This note was " completed in part utilizing [employer] Software.  Grammatical errors, random word insertions, spelling mistakes, and incomplete sentences can be an occasional consequence of this system secondary to software limitations, ambient noise, and hardware issues.  If you have any questions or concerns about the content, text, or information contained within the body of this dictation, please contact the provider for clarification.

## 2024-11-18 ENCOUNTER — TELEPHONE (OUTPATIENT)
Age: 64
End: 2024-11-18

## 2024-11-18 DIAGNOSIS — Z13.1 SCREENING FOR DIABETES MELLITUS (DM): ICD-10-CM

## 2024-11-18 DIAGNOSIS — R73.03 PREDIABETES: ICD-10-CM

## 2024-11-18 NOTE — TELEPHONE ENCOUNTER
PT requesting labs to be ordered so he may get it completed before his appt with Dr Goldstein on 12/27. Please send lab scripts to [facility] [address] [place] [address] Dr Apodaca Fax: [phone], and give him a call to let him know labs were sent, as to give him a heads up, per his request.    Please advise    ThankYou

## 2024-11-20 DIAGNOSIS — R03.0 ELEVATED BP WITHOUT DIAGNOSIS OF HYPERTENSION: ICD-10-CM

## 2024-11-20 DIAGNOSIS — E78.00 PURE HYPERCHOLESTEROLEMIA: ICD-10-CM

## 2024-11-21 RX ORDER — AMLODIPINE BESYLATE AND ATORVASTATIN CALCIUM 5; 10 MG/1; MG/1
1 TABLET, FILM COATED ORAL DAILY
Qty: 90 TABLET | Refills: 1 | Status: SHIPPED | OUTPATIENT
Start: 2024-11-21

## 2024-11-25 ENCOUNTER — TELEPHONE (OUTPATIENT)
Age: 64
End: 2024-11-25

## 2024-11-25 NOTE — TELEPHONE ENCOUNTER
Kristi called  from [facility] stating that Dr. Ramires's state license number is coming up  so they are unable to refill caduet 5-10mg.     Kristi call back ph#[phone] opt2  ref#[id number]    Please advise

## 2024-11-26 NOTE — TELEPHONE ENCOUNTER
I called and spoke to Ibeth from [facility].  Dr. Ramires's address was coming up as the [facility].  Address and phone number have been updated with [facility].

## 2024-11-26 NOTE — TELEPHONE ENCOUNTER
I'm not 100% sure how to go about this.   The pharmacy is based out of [place], and Dr. Ramires does not have a license in [place].   This is why this keep happening; it's not the first time.

## 2024-12-06 LAB
ALBUMIN SERPL-MCNC: 4.4 G/DL (ref 3.9–4.9)
ALP SERPL-CCNC: 88 IU/L (ref 44–121)
ALT SERPL-CCNC: 31 IU/L (ref 0–44)
AST SERPL-CCNC: 28 IU/L (ref 0–40)
BILIRUB SERPL-MCNC: 0.3 MG/DL (ref 0–1.2)
BUN SERPL-MCNC: 10 MG/DL (ref 8–27)
BUN/CREAT SERPL: 11 (ref 10–24)
CALCIUM SERPL-MCNC: 9 MG/DL (ref 8.6–10.2)
CHLORIDE SERPL-SCNC: 102 MMOL/L (ref 96–106)
CO2 SERPL-SCNC: 26 MMOL/L (ref 20–29)
CREAT SERPL-MCNC: 0.9 MG/DL (ref 0.76–1.27)
EGFR: 95 ML/MIN/1.73
EST. AVERAGE GLUCOSE BLD GHB EST-MCNC: 134 MG/DL
GLOBULIN SER-MCNC: 2.6 G/DL (ref 1.5–4.5)
GLUCOSE SERPL-MCNC: 103 MG/DL (ref 70–99)
HBA1C MFR BLD: 6.3 % (ref 4.8–5.6)
POTASSIUM SERPL-SCNC: 4.1 MMOL/L (ref 3.5–5.2)
PROT SERPL-MCNC: 7 G/DL (ref 6–8.5)
SODIUM SERPL-SCNC: 141 MMOL/L (ref 134–144)

## 2024-12-07 ENCOUNTER — OFFICE VISIT (OUTPATIENT)
Dept: URGENT CARE | Facility: CLINIC | Age: 64
End: 2024-12-07
Payer: COMMERCIAL

## 2024-12-07 ENCOUNTER — RESULTS FOLLOW-UP (OUTPATIENT)
Dept: FAMILY MEDICINE CLINIC | Facility: CLINIC | Age: 64
End: 2024-12-07

## 2024-12-07 VITALS
OXYGEN SATURATION: 97 % | RESPIRATION RATE: 16 BRPM | TEMPERATURE: 102.3 F | HEART RATE: 87 BPM | BODY MASS INDEX: 24.48 KG/M2 | WEIGHT: 161 LBS

## 2024-12-07 DIAGNOSIS — J06.9 VIRAL UPPER RESPIRATORY TRACT INFECTION: Primary | ICD-10-CM

## 2024-12-07 PROCEDURE — 99213 OFFICE O/P EST LOW 20 MIN: CPT | Performed by: PHYSICIAN ASSISTANT

## 2024-12-07 RX ORDER — BENZONATATE 200 MG/1
200 CAPSULE ORAL 3 TIMES DAILY PRN
Qty: 20 CAPSULE | Refills: 0 | Status: SHIPPED | OUTPATIENT
Start: 2024-12-07

## 2024-12-07 NOTE — PROGRESS NOTES
[facility]        NAME: Rock Kline is a 64 y.o. male  : 1960    MRN: [id number]  DATE: 2024  TIME: 1:45 PM    Assessment and Plan   Viral upper respiratory tract infection [J06.9]  1. Viral upper respiratory tract infection  benzonatate (TESSALON) 200 MG capsule        Patient Instructions   Viral upper respiratory infection  Recommend flonase nasal spray and sudafed for postnasal drip/cough  Warm salt water gargles  Rest, fluids and supportive care  May benefit from a cool mist humidifier on night stand  Tylenol/ibuprofen as needed for pain/fever      Follow up with PCP in 3-5 days.  Proceed to  ER if symptoms worsen.    If tests have been performed at [facility], our office will contact you with results if changes need to be made to the care plan discussed with you at the visit.  [facility].    Chief Complaint     Chief Complaint   Patient presents with    Cold Like Symptoms     Pt presents with cough, chest congestion, sinus congestion; started on Friday         History of Present Illness       Rock is a 64-year-old male who presents to the clinic complaining of cough x 3 days.  Also notes bilateral sinus pain and pressure, sneezing, nasal congestion, rhinorrhea, sore throat, and fever.  Fever today in the office 102 °F.  Denies any chills, fatigue, myalgias, ear pain, shortness of breath, nausea, vomiting, diarrhea, loss of taste or smell, recent travel, or any known sick contacts.        Review of Systems   Review of Systems   Constitutional:  Positive for fever. Negative for chills and fatigue.   HENT:  Positive for congestion, rhinorrhea, sinus pressure, sinus pain, sneezing and sore throat. Negative for ear pain.    Respiratory:  Positive for cough. Negative for chest tightness and shortness of breath.    Gastrointestinal:  Negative for diarrhea, nausea and vomiting.   Musculoskeletal:  Negative for myalgias.   Neurological:   Negative for dizziness and headaches.         Current Medications       Current Outpatient Medications:     amLODIPine-atorvastatin (CADUET) 5-10 MG per tablet, Take 1 tablet by mouth daily, Disp: 90 tablet, Rfl: 1    benzonatate (TESSALON) 200 MG capsule, Take 1 capsule (200 mg total) by mouth 3 (three) times a day as needed for cough, Disp: 20 capsule, Rfl: 0    Current Allergies     Allergies as of 12/07/2024    (No Known Allergies)            The following portions of the patient's history were reviewed and updated as appropriate: allergies, current medications, past family history, past medical history, past social history, past surgical history and problem list.     Past Medical History:   Diagnosis Date    Colon polyp     Hyperlipidemia        Past Surgical History:   Procedure Laterality Date    COLONOSCOPY      HERNIA REPAIR  1998       Family History   Problem Relation Age of Onset    Hypertension Mother     Hyperlipidemia Father          Medications have been verified.        Objective   Pulse 87   Temp (!) 102.3 °F (39.1 °C)   Resp 16   Wt 73 kg (161 lb)   SpO2 97%   BMI 24.48 kg/m²   No LMP for male patient.       Physical Exam     Physical Exam  Vitals and nursing note reviewed.   Constitutional:       General: He is not in acute distress.     Appearance: Normal appearance. He is not ill-appearing.   HENT:      Right Ear: Tympanic membrane, ear canal and external ear normal.      Left Ear: Tympanic membrane, ear canal and external ear normal.      Nose: Congestion present.      Right Sinus: No maxillary sinus tenderness or frontal sinus tenderness.      Left Sinus: No maxillary sinus tenderness or frontal sinus tenderness.      Mouth/Throat:      Mouth: Mucous membranes are moist.      Pharynx: No oropharyngeal exudate or posterior oropharyngeal erythema.   Cardiovascular:      Rate and Rhythm: Normal rate and regular rhythm.      Heart sounds: Normal heart sounds.   Pulmonary:      Effort:  Pulmonary effort is normal. No respiratory distress.      Breath sounds: Normal breath sounds. No stridor. No wheezing, rhonchi or rales.   Lymphadenopathy:      Cervical: No cervical adenopathy.   Neurological:      Mental Status: He is alert and oriented to person, place, and time.   Psychiatric:         Mood and Affect: Mood normal.         Behavior: Behavior normal.

## 2024-12-27 ENCOUNTER — OFFICE VISIT (OUTPATIENT)
Dept: FAMILY MEDICINE CLINIC | Facility: CLINIC | Age: 64
End: 2024-12-27
Payer: COMMERCIAL

## 2024-12-27 VITALS
SYSTOLIC BLOOD PRESSURE: 110 MMHG | RESPIRATION RATE: 16 BRPM | HEART RATE: 58 BPM | BODY MASS INDEX: 25.11 KG/M2 | HEIGHT: 67 IN | WEIGHT: 160 LBS | DIASTOLIC BLOOD PRESSURE: 80 MMHG | TEMPERATURE: 96.7 F

## 2024-12-27 DIAGNOSIS — R73.03 PREDIABETES: ICD-10-CM

## 2024-12-27 DIAGNOSIS — R97.20 ELEVATED PSA: ICD-10-CM

## 2024-12-27 DIAGNOSIS — J20.8 ACUTE BRONCHITIS DUE TO OTHER SPECIFIED ORGANISMS: ICD-10-CM

## 2024-12-27 DIAGNOSIS — Z23 IMMUNIZATION DUE: ICD-10-CM

## 2024-12-27 DIAGNOSIS — Z00.00 HEALTHCARE MAINTENANCE: Primary | ICD-10-CM

## 2024-12-27 DIAGNOSIS — I10 PRIMARY HYPERTENSION: ICD-10-CM

## 2024-12-27 DIAGNOSIS — Z91.89 FRAMINGHAM CARDIAC RISK 10-20% IN NEXT 10 YEARS: ICD-10-CM

## 2024-12-27 PROCEDURE — 99397 PER PM REEVAL EST PAT 65+ YR: CPT | Performed by: FAMILY MEDICINE

## 2024-12-27 RX ORDER — AZITHROMYCIN 250 MG/1
TABLET, FILM COATED ORAL
Qty: 6 TABLET | Refills: 0 | Status: SHIPPED | OUTPATIENT
Start: 2024-12-27 | End: 2025-01-01

## 2024-12-27 NOTE — PROGRESS NOTES
Assessment/Plan:    No problem-specific Assessment & Plan notes found for this encounter.    Cpe    Prolonged cough with yellow mucus  Subacute bronchitis  Mucinex dm  Abx  F/u if no better    Suggest IPPE in next 6 months    Recheck PSA  Has been elevated  Offer local urology f/u  Hx of bx in past    Seeing cardiology for caduet  Advised can split meds if combo expensive with new insurance    Htn stable    Continue statin for risk reduction     Diagnoses and all orders for this visit:    Healthcare maintenance    Immunization due  -     TDAP VACCINE GREATER THAN OR EQUAL TO 8YO IM    Elevated PSA  -     Ambulatory referral to Urology; Future    Acute bronchitis due to other specified organisms  -     azithromycin (ZITHROMAX) 250 mg tablet; Take 500mg on day 1, 250mg on days 2-5    Prediabetes    [place] cardiac risk 10-20% in next 10 years    Primary hypertension        Return if symptoms worsen or fail to improve.    Subjective:      Patient ID: Rock Kline is a 64 y.o. male.    Chief Complaint   Patient presents with    Physical Exam     Cmavp        HPI  Taking his meds  Getting medicare next week  Retiring from working in [employer]    No fam hx of AAA or personal hx of smoking  Exercises some, 2x/w  Not intense though    Fbs 103 on recent labs    Uro in [facility] for elevated psa  MRI guided prostate bx in past    Was in  20d ago   Given tessalon for uri  Has lingering cough  Still some yellow mucus  No fever    The following portions of the patient's history were reviewed and updated as appropriate: allergies, current medications, past family history, past medical history, past social history, past surgical history and problem list.    Review of Systems   Constitutional:  Negative for chills and fever.         Current Outpatient Medications   Medication Sig Dispense Refill    amLODIPine-atorvastatin (CADUET) 5-10 MG per tablet Take 1 tablet by mouth daily 90 tablet 1    azithromycin (ZITHROMAX) 250  "mg tablet Take 500mg on day 1, 250mg on days 2-5 6 tablet 0     No current facility-administered medications for this visit.       Objective:    /80   Pulse 58   Temp (!) 96.7 °F (35.9 °C)   Resp 16   Ht 5' 6.54\" (1.69 m)   Wt 72.6 kg (160 lb)   BMI 25.41 kg/m²        Physical Exam  Vitals and nursing note reviewed.   Constitutional:       General: He is not in acute distress.     Appearance: He is well-developed. He is not ill-appearing.   HENT:      Head: Normocephalic.      Right Ear: Tympanic membrane normal.      Left Ear: Tympanic membrane normal.      Nose: No congestion.   Eyes:      General: No scleral icterus.     Conjunctiva/sclera: Conjunctivae normal.   Neck:      Vascular: No carotid bruit.   Cardiovascular:      Rate and Rhythm: Normal rate and regular rhythm.      Heart sounds: No murmur heard.  Pulmonary:      Effort: Pulmonary effort is normal. No respiratory distress.      Breath sounds: No wheezing.      Comments: Coarse midline cough present.    Abdominal:      General: There is no distension.      Palpations: Abdomen is soft. There is no mass.   Musculoskeletal:         General: No deformity.      Cervical back: Neck supple.      Right lower leg: No edema.      Left lower leg: No edema.   Skin:     General: Skin is warm and dry.      Coloration: Skin is not pale.   Neurological:      Mental Status: He is alert.      Motor: No weakness.      Gait: Gait normal.   Psychiatric:         Mood and Affect: Mood normal.         Behavior: Behavior normal.         Thought Content: Thought content normal.           Livan Goldstein,     "

## 2024-12-31 ENCOUNTER — CLINICAL SUPPORT (OUTPATIENT)
Dept: FAMILY MEDICINE CLINIC | Facility: CLINIC | Age: 64
End: 2024-12-31
Payer: COMMERCIAL

## 2024-12-31 DIAGNOSIS — Z23 IMMUNIZATION DUE: Primary | ICD-10-CM

## 2024-12-31 PROCEDURE — 90715 TDAP VACCINE 7 YRS/> IM: CPT

## 2024-12-31 PROCEDURE — 90471 IMMUNIZATION ADMIN: CPT

## 2025-01-31 ENCOUNTER — OFFICE VISIT (OUTPATIENT)
Dept: FAMILY MEDICINE CLINIC | Facility: CLINIC | Age: 65
End: 2025-01-31
Payer: MEDICARE

## 2025-01-31 VITALS
BODY MASS INDEX: 25.24 KG/M2 | HEIGHT: 67 IN | DIASTOLIC BLOOD PRESSURE: 70 MMHG | TEMPERATURE: 96.5 F | SYSTOLIC BLOOD PRESSURE: 132 MMHG | WEIGHT: 160.8 LBS | RESPIRATION RATE: 18 BRPM | HEART RATE: 56 BPM

## 2025-01-31 DIAGNOSIS — R73.03 PREDIABETES: ICD-10-CM

## 2025-01-31 DIAGNOSIS — J06.9 ACUTE URI: Primary | ICD-10-CM

## 2025-01-31 DIAGNOSIS — I10 PRIMARY HYPERTENSION: ICD-10-CM

## 2025-01-31 DIAGNOSIS — E78.49 OTHER HYPERLIPIDEMIA: ICD-10-CM

## 2025-01-31 DIAGNOSIS — R05.9 COUGH IN ADULT: ICD-10-CM

## 2025-01-31 LAB
SARS-COV-2 AG UPPER RESP QL IA: NEGATIVE
SL AMB POCT RAPID FLU A: NEGATIVE
SL AMB POCT RAPID FLU B: NEGATIVE
VALID CONTROL: NORMAL

## 2025-01-31 PROCEDURE — 87811 SARS-COV-2 COVID19 W/OPTIC: CPT | Performed by: FAMILY MEDICINE

## 2025-01-31 PROCEDURE — 87804 INFLUENZA ASSAY W/OPTIC: CPT | Performed by: FAMILY MEDICINE

## 2025-01-31 PROCEDURE — 99214 OFFICE O/P EST MOD 30 MIN: CPT | Performed by: FAMILY MEDICINE

## 2025-01-31 PROCEDURE — G2211 COMPLEX E/M VISIT ADD ON: HCPCS | Performed by: FAMILY MEDICINE

## 2025-01-31 RX ORDER — AZITHROMYCIN 250 MG/1
TABLET, FILM COATED ORAL
Qty: 6 TABLET | Refills: 0 | Status: SHIPPED | OUTPATIENT
Start: 2025-01-31 | End: 2025-02-05

## 2025-01-31 NOTE — PROGRESS NOTES
"Name: Rock Kline      : 1960      MRN: [id number]  Encounter Provider: Livan Goldstein DO  Encounter Date: 2025   Encounter department: [facility]  :  Assessment & Plan  Cough in adult  Tests neg  Orders:    POCT Rapid Covid Ag    POCT rapid flu A and B    Acute URI  Mucinex DM suggested  Add on abx if no better  Orders:    azithromycin (ZITHROMAX) 250 mg tablet; Take 500mg on day 1, 250mg on days 2-5    Primary hypertension  Stable on meds       Prediabetes  Low carb diet  F/u in future q6-12m       Other hyperlipidemia  Stable on statin  LDL 71          History of Present Illness   HPI  Cough  Mucus is light brown/green  Moist  About 3rd day  No fever  No sick contacts  Wife ok    A/b/cov neg    Review of Systems   Respiratory:  Negative for shortness of breath and wheezing.      Family History   Problem Relation Age of Onset    Hypertension Mother     Hyperlipidemia Father       Social History     Tobacco Use    Smoking status: Never     Passive exposure: Never    Smokeless tobacco: Never   Vaping Use    Vaping status: Never Used   Substance Use Topics    Alcohol use: Yes     Alcohol/week: 5.0 standard drinks of alcohol     Types: 5 Standard drinks or equivalent per week    Drug use: No      Current Outpatient Medications   Medication Instructions    amLODIPine-atorvastatin (CADUET) 5-10 MG per tablet 1 tablet, Oral, Daily    azithromycin (ZITHROMAX) 250 mg tablet Take 500mg on day 1, 250mg on days 2-5       >>>>>>>>  Return if symptoms worsen or fail to improve.    Visit Vitals  /70   Pulse 56   Temp (!) 96.5 °F (35.8 °C)   Resp 18   Ht 5' 6.54\" (1.69 m)   Wt 72.9 kg (160 lb 12.8 oz)   BMI 25.53 kg/m²   Smoking Status Never   BSA 1.83 m²        Objective   /70   Pulse 56   Temp (!) 96.5 °F (35.8 °C)   Resp 18   Ht 5' 6.54\" (1.69 m)   Wt 72.9 kg (160 lb 12.8 oz)   BMI 25.53 kg/m²      Physical Exam  Vitals and nursing note reviewed.   Constitutional:       " General: He is not in acute distress.     Appearance: He is well-developed. He is not ill-appearing.   HENT:      Head: Normocephalic.      Right Ear: Tympanic membrane and ear canal normal.      Left Ear: Tympanic membrane and ear canal normal.      Nose: No congestion.      Mouth/Throat:      Pharynx: No oropharyngeal exudate.   Eyes:      Conjunctiva/sclera: Conjunctivae normal.   Neck:      Vascular: No carotid bruit.   Cardiovascular:      Rate and Rhythm: Normal rate and regular rhythm.      Heart sounds: No murmur heard.  Pulmonary:      Effort: Pulmonary effort is normal. No respiratory distress.      Breath sounds: No wheezing.      Comments: Coarse midline cough present.    Abdominal:      Palpations: Abdomen is soft.   Musculoskeletal:         General: No deformity.      Cervical back: Neck supple.      Right lower leg: No edema.      Left lower leg: No edema.   Skin:     General: Skin is warm and dry.      Coloration: Skin is not pale.   Neurological:      Mental Status: He is alert.      Motor: No weakness.      Gait: Gait normal.   Psychiatric:         Mood and Affect: Mood normal.         Behavior: Behavior normal.         Thought Content: Thought content normal.

## 2025-02-06 ENCOUNTER — TELEPHONE (OUTPATIENT)
Age: 65
End: 2025-02-06

## 2025-02-06 NOTE — TELEPHONE ENCOUNTER
Pt called and asking for call back as he was in [place] [facility] for his condition and had severe cough and has been on the antibiotics since monday and tomorrow is last pill and no relief. Please call pt to discuss

## 2025-02-07 DIAGNOSIS — J01.00 ACUTE NON-RECURRENT MAXILLARY SINUSITIS: ICD-10-CM

## 2025-02-07 DIAGNOSIS — J01.00 ACUTE NON-RECURRENT MAXILLARY SINUSITIS: Primary | ICD-10-CM

## 2025-02-07 RX ORDER — DOXYCYCLINE 100 MG/1
100 TABLET ORAL 2 TIMES DAILY
Qty: 20 TABLET | Refills: 0 | Status: SHIPPED | OUTPATIENT
Start: 2025-02-07 | End: 2025-02-17

## 2025-02-07 NOTE — TELEPHONE ENCOUNTER
Patient follow up on message and would like a call back from Dr. Soares. Please advise. Thank you.

## 2025-02-07 NOTE — TELEPHONE ENCOUNTER
Please let him know that since he still has a fever, I will send a new 10 day antibiotic and he should take Delsym otc for the cough

## 2025-02-07 NOTE — TELEPHONE ENCOUNTER
Patient calling to follow up with message from yesterday, he stated he is taking last antibiotic today, yesterday started with a low grade fever of 100.6, but he cannot sleep due to coughing.  Took home covid test and was negative.  He really would like to know if there's anything else he can take, especially for the cough. Please review

## 2025-02-11 ENCOUNTER — TELEPHONE (OUTPATIENT)
Dept: CARDIOLOGY CLINIC | Facility: CLINIC | Age: 65
End: 2025-02-11

## 2025-02-11 NOTE — TELEPHONE ENCOUNTER
Patient called the RX Refill Line. Message is being forwarded to the office.     Patient called stating that amLODIPine-atorvastatin (CADUET) 5-10 MG is not covered by their new insurance but they will cover the medications as 2 separate prescriptions (amLODIPine 5mg and Atorvastatin 10mg) Please review patient would like the prescriptions sent   To [facility] - [address]  for a 90 days supply

## 2025-02-20 DIAGNOSIS — I10 PRIMARY HYPERTENSION: Primary | ICD-10-CM

## 2025-02-20 DIAGNOSIS — E78.2 MIXED HYPERLIPIDEMIA: ICD-10-CM

## 2025-02-20 RX ORDER — ATORVASTATIN CALCIUM 10 MG/1
10 TABLET, FILM COATED ORAL DAILY
Qty: 90 TABLET | Refills: 3 | Status: SHIPPED | OUTPATIENT
Start: 2025-02-20

## 2025-02-20 RX ORDER — AMLODIPINE BESYLATE 5 MG/1
5 TABLET ORAL DAILY
Qty: 90 TABLET | Refills: 3 | Status: SHIPPED | OUTPATIENT
Start: 2025-02-20

## 2025-06-02 ENCOUNTER — OFFICE VISIT (OUTPATIENT)
Dept: CARDIOLOGY CLINIC | Facility: CLINIC | Age: 65
End: 2025-06-02
Payer: MEDICARE

## 2025-06-02 VITALS
OXYGEN SATURATION: 99 % | HEART RATE: 58 BPM | BODY MASS INDEX: 24.59 KG/M2 | DIASTOLIC BLOOD PRESSURE: 80 MMHG | SYSTOLIC BLOOD PRESSURE: 124 MMHG | WEIGHT: 153 LBS | HEIGHT: 66 IN

## 2025-06-02 DIAGNOSIS — I10 PRIMARY HYPERTENSION: Primary | ICD-10-CM

## 2025-06-02 DIAGNOSIS — E78.2 MIXED HYPERLIPIDEMIA: ICD-10-CM

## 2025-06-02 DIAGNOSIS — E78.00 PURE HYPERCHOLESTEROLEMIA: ICD-10-CM

## 2025-06-02 PROCEDURE — 93000 ELECTROCARDIOGRAM COMPLETE: CPT | Performed by: INTERNAL MEDICINE

## 2025-06-02 PROCEDURE — 99214 OFFICE O/P EST MOD 30 MIN: CPT | Performed by: INTERNAL MEDICINE

## 2025-06-02 NOTE — PROGRESS NOTES
Cardiology   Kinza Ramires DO, [facility]  Anjel Kerns MD, [facility]  Cecil Fuentes MD, [facility]  Maynor Choi MD, [facility]  -------------------------------------------------------------------  [facility]  [address], [address], [address]  [place], [place], [address]  [phone]    Cardiology Follow Up  Rock Kline  1960  [id number]          Assessment/Plan:    1. Primary hypertension  - BP was elevated on initial assessment but is significantly improved after waiting in office.  Will continue amlodipine 5 mg daily    2. Mixed hyperlipidemia  -Significantly improved with addition of atorvastatin 10 mg daily.  -Discussed diet.  He overall eats a good diet.  - Lipid panel ordered    3. Prediabetes  -A1c has been elevated over the past few years.  Has not reached diabetes range.  Was educated on potential risk of elevated sugar with statin use.  Should continue to use this medication however.  - A1C ordered    4. [place] cardiac risk 10-20% in next 10 years  Discussed risk factor reduction including refraining from smoking, eating a diet high in fruits and vegetables, maintaining a healthy weight, limiting screen time along with controlling BP and cholesterol.  Encouraged to exercise 150 minutes a week at a moderate level such as a fast walk or 75 minutes of high intensity.             Interval History:     Rock Kline is 64 y.o. male here for followup of hyperlipidemia.    He was initially seen on March 6, 2024 for evaluation of cardiac risk.  He has a history of hypertension and hyperlipidemia.  He had no symptoms at the time.  Lipid levels in December 2023 showed a total cholesterol 223, HDL of 56 and LDL of 153.  A1c was 5.9%.  CT coronary calcium score was ordered at patient was hesitant to begin medical therapy.  Calcium score was elevated at 892 which is 92nd percentile for age and race and gender matched cohort.  Afterwards, he was started on  "amlodipine 5 mg and atorvastatin 10 mg daily.  Repeat blood work was done which showed an LDL of 71 mg/dL, total cholesterol 114 and HDL of 55.       Since his last visit, he has been feeling well.  he denies any palpitations, chest pain, shortness of breath, LE edema, orthopnea or PND.   Diet is overall unchanged.  There has not been a significant change in weight.         The following portions of the patient's history were reviewed and updated as appropriate: allergies, current medications, past family history, past medical history, past social history, past surgical history, and problem list.       Current Outpatient Medications:     amLODIPine (NORVASC) 5 mg tablet, Take 1 tablet (5 mg total) by mouth daily, Disp: 90 tablet, Rfl: 3    atorvastatin (LIPITOR) 10 mg tablet, Take 1 tablet (10 mg total) by mouth daily, Disp: 90 tablet, Rfl: 3        Review of Systems:  Review of Systems   Respiratory:  Negative for shortness of breath.    Cardiovascular:  Negative for chest pain, palpitations and leg swelling.   All other systems reviewed and are negative.        Physical Exam:  Vitals:  Vitals:    06/02/25 0752   BP: 124/80   BP Location: Left arm   Patient Position: Sitting   Cuff Size: Large   Pulse: 58   SpO2: 99%   Weight: 69.4 kg (153 lb)   Height: 5' 6.4\" (1.687 m)     Physical Exam   Constitutional: He appears healthy. No distress.   Eyes: Pupils are equal, round, and reactive to light. Conjunctivae are normal.   Neck: No JVD present.   Cardiovascular: Normal rate, regular rhythm and normal heart sounds. Exam reveals no gallop and no friction rub.   No murmur heard.  Pulmonary/Chest: Effort normal and breath sounds normal. He has no wheezes. He has no rales.   Musculoskeletal:         General: No tenderness, deformity or edema.      Cervical back: Normal range of motion and neck supple.   Neurological: He is alert and oriented to person, place, and time.   Skin: Skin is warm and dry.    "     Cardiographics:  EKG: Personally reviewed NSR  See above    This note was completed in part utilizing M-[employer] Fluency Direct Software.  Grammatical errors, random word insertions, spelling mistakes, and incomplete sentences can be an occasional consequence of this system secondary to software limitations, ambient noise, and hardware issues.  If you have any questions or concerns about the content, text, or information contained within the body of this dictation, please contact the provider for clarification.

## 2025-07-31 ENCOUNTER — RESULTS FOLLOW-UP (OUTPATIENT)
Dept: CARDIOLOGY CLINIC | Facility: CLINIC | Age: 65
End: 2025-07-31

## 2025-07-31 LAB
ALBUMIN SERPL-MCNC: 4.5 G/DL (ref 3.9–4.9)
ALP SERPL-CCNC: 85 IU/L (ref 44–121)
ALT SERPL-CCNC: 30 IU/L (ref 0–44)
AST SERPL-CCNC: 30 IU/L (ref 0–40)
BASOPHILS # BLD AUTO: 0 X10E3/UL (ref 0–0.2)
BASOPHILS NFR BLD AUTO: 1 %
BILIRUB SERPL-MCNC: 0.6 MG/DL (ref 0–1.2)
BUN SERPL-MCNC: 18 MG/DL (ref 8–27)
BUN/CREAT SERPL: 21 (ref 10–24)
CALCIUM SERPL-MCNC: 9.5 MG/DL (ref 8.6–10.2)
CHLORIDE SERPL-SCNC: 103 MMOL/L (ref 96–106)
CHOLEST SERPL-MCNC: 174 MG/DL (ref 100–199)
CHOLEST/HDLC SERPL: 3.1 RATIO (ref 0–5)
CO2 SERPL-SCNC: 22 MMOL/L (ref 20–29)
CREAT SERPL-MCNC: 0.86 MG/DL (ref 0.76–1.27)
EGFR: 96 ML/MIN/1.73
EOSINOPHIL # BLD AUTO: 0.2 X10E3/UL (ref 0–0.4)
EOSINOPHIL NFR BLD AUTO: 5 %
ERYTHROCYTE [DISTWIDTH] IN BLOOD BY AUTOMATED COUNT: 13.6 % (ref 11.6–15.4)
GLOBULIN SER-MCNC: 2.5 G/DL (ref 1.5–4.5)
GLUCOSE SERPL-MCNC: 95 MG/DL (ref 70–99)
HCT VFR BLD AUTO: 46.5 % (ref 37.5–51)
HDLC SERPL-MCNC: 56 MG/DL
HGB BLD-MCNC: 14.9 G/DL (ref 13–17.7)
IMM GRANULOCYTES # BLD: 0 X10E3/UL (ref 0–0.1)
IMM GRANULOCYTES NFR BLD: 0 %
LDLC SERPL CALC-MCNC: 104 MG/DL (ref 0–99)
LYMPHOCYTES # BLD AUTO: 1.8 X10E3/UL (ref 0.7–3.1)
LYMPHOCYTES NFR BLD AUTO: 38 %
MCH RBC QN AUTO: 28.3 PG (ref 26.6–33)
MCHC RBC AUTO-ENTMCNC: 32 G/DL (ref 31.5–35.7)
MCV RBC AUTO: 88 FL (ref 79–97)
MONOCYTES # BLD AUTO: 0.5 X10E3/UL (ref 0.1–0.9)
MONOCYTES NFR BLD AUTO: 11 %
NEUTROPHILS # BLD AUTO: 2.1 X10E3/UL (ref 1.4–7)
NEUTROPHILS NFR BLD AUTO: 45 %
PLATELET # BLD AUTO: 271 X10E3/UL (ref 150–450)
POTASSIUM SERPL-SCNC: 5 MMOL/L (ref 3.5–5.2)
PROT SERPL-MCNC: 7 G/DL (ref 6–8.5)
RBC # BLD AUTO: 5.26 X10E6/UL (ref 4.14–5.8)
SL AMB VLDL CHOLESTEROL CALC: 14 MG/DL (ref 5–40)
SODIUM SERPL-SCNC: 140 MMOL/L (ref 134–144)
TRIGL SERPL-MCNC: 77 MG/DL (ref 0–149)
WBC # BLD AUTO: 4.7 X10E3/UL (ref 3.4–10.8)

## 2025-08-06 ENCOUNTER — OFFICE VISIT (OUTPATIENT)
Dept: FAMILY MEDICINE CLINIC | Facility: CLINIC | Age: 65
End: 2025-08-06
Payer: MEDICARE

## 2025-08-06 VITALS
SYSTOLIC BLOOD PRESSURE: 136 MMHG | BODY MASS INDEX: 24.33 KG/M2 | RESPIRATION RATE: 18 BRPM | DIASTOLIC BLOOD PRESSURE: 76 MMHG | HEIGHT: 67 IN | WEIGHT: 155 LBS | HEART RATE: 55 BPM | TEMPERATURE: 96.4 F

## 2025-08-06 DIAGNOSIS — Z00.00 WELCOME TO MEDICARE PREVENTIVE VISIT: Primary | ICD-10-CM

## 2025-08-06 PROBLEM — R73.03 PREDIABETES: Status: RESOLVED | Noted: 2018-03-16 | Resolved: 2025-08-06

## 2025-08-06 PROCEDURE — G0402 INITIAL PREVENTIVE EXAM: HCPCS | Performed by: FAMILY MEDICINE
